# Patient Record
Sex: MALE | Race: WHITE | NOT HISPANIC OR LATINO
[De-identification: names, ages, dates, MRNs, and addresses within clinical notes are randomized per-mention and may not be internally consistent; named-entity substitution may affect disease eponyms.]

---

## 2017-01-04 ENCOUNTER — MEDICATION RENEWAL (OUTPATIENT)
Age: 57
End: 2017-01-04

## 2017-01-23 ENCOUNTER — RX RENEWAL (OUTPATIENT)
Age: 57
End: 2017-01-23

## 2017-02-27 ENCOUNTER — APPOINTMENT (OUTPATIENT)
Dept: INTERNAL MEDICINE | Facility: CLINIC | Age: 57
End: 2017-02-27

## 2017-02-27 VITALS
RESPIRATION RATE: 12 BRPM | HEIGHT: 72 IN | BODY MASS INDEX: 25.06 KG/M2 | WEIGHT: 185 LBS | HEART RATE: 68 BPM | DIASTOLIC BLOOD PRESSURE: 79 MMHG | SYSTOLIC BLOOD PRESSURE: 113 MMHG

## 2017-02-27 LAB
GLUCOSE BLDC GLUCOMTR-MCNC: NORMAL
GLUCOSE SERPL-MCNC: NORMAL

## 2017-03-06 ENCOUNTER — MOBILE ON CALL (OUTPATIENT)
Age: 57
End: 2017-03-06

## 2017-06-12 ENCOUNTER — APPOINTMENT (OUTPATIENT)
Dept: INTERNAL MEDICINE | Facility: CLINIC | Age: 57
End: 2017-06-12

## 2017-07-21 LAB — HBA1C MFR BLD HPLC: 5.8 %

## 2017-07-24 ENCOUNTER — APPOINTMENT (OUTPATIENT)
Dept: INTERNAL MEDICINE | Facility: CLINIC | Age: 57
End: 2017-07-24

## 2017-07-24 VITALS
WEIGHT: 187 LBS | HEIGHT: 72 IN | HEART RATE: 65 BPM | DIASTOLIC BLOOD PRESSURE: 70 MMHG | SYSTOLIC BLOOD PRESSURE: 111 MMHG | RESPIRATION RATE: 12 BRPM | BODY MASS INDEX: 25.33 KG/M2 | OXYGEN SATURATION: 98 %

## 2017-07-24 LAB — GLUCOSE BLDC GLUCOMTR-MCNC: 124

## 2017-12-18 ENCOUNTER — TRANSCRIPTION ENCOUNTER (OUTPATIENT)
Age: 57
End: 2017-12-18

## 2018-01-07 ENCOUNTER — FORM ENCOUNTER (OUTPATIENT)
Age: 58
End: 2018-01-07

## 2018-01-08 ENCOUNTER — APPOINTMENT (OUTPATIENT)
Dept: OTOLARYNGOLOGY | Facility: CLINIC | Age: 58
End: 2018-01-08
Payer: COMMERCIAL

## 2018-01-08 ENCOUNTER — OUTPATIENT (OUTPATIENT)
Dept: OUTPATIENT SERVICES | Facility: HOSPITAL | Age: 58
LOS: 1 days | End: 2018-01-08
Payer: COMMERCIAL

## 2018-01-08 ENCOUNTER — APPOINTMENT (OUTPATIENT)
Dept: RADIOLOGY | Facility: HOSPITAL | Age: 58
End: 2018-01-08

## 2018-01-08 VITALS
HEIGHT: 72 IN | TEMPERATURE: 98.7 F | WEIGHT: 187 LBS | BODY MASS INDEX: 25.33 KG/M2 | HEART RATE: 90 BPM | OXYGEN SATURATION: 98 % | SYSTOLIC BLOOD PRESSURE: 132 MMHG | DIASTOLIC BLOOD PRESSURE: 72 MMHG

## 2018-01-08 DIAGNOSIS — S09.92XA UNSPECIFIED INJURY OF NOSE, INITIAL ENCOUNTER: ICD-10-CM

## 2018-01-08 PROCEDURE — 99203 OFFICE O/P NEW LOW 30 MIN: CPT

## 2018-01-08 PROCEDURE — 70160 X-RAY EXAM OF NASAL BONES: CPT | Mod: 26

## 2018-01-08 PROCEDURE — 70160 X-RAY EXAM OF NASAL BONES: CPT

## 2018-01-16 ENCOUNTER — RESULT REVIEW (OUTPATIENT)
Age: 58
End: 2018-01-16

## 2018-01-19 ENCOUNTER — APPOINTMENT (OUTPATIENT)
Dept: OTOLARYNGOLOGY | Facility: CLINIC | Age: 58
End: 2018-01-19

## 2018-01-29 ENCOUNTER — RX RENEWAL (OUTPATIENT)
Age: 58
End: 2018-01-29

## 2018-03-23 ENCOUNTER — OTHER (OUTPATIENT)
Age: 58
End: 2018-03-23

## 2018-03-26 ENCOUNTER — APPOINTMENT (OUTPATIENT)
Dept: INTERNAL MEDICINE | Facility: CLINIC | Age: 58
End: 2018-03-26
Payer: COMMERCIAL

## 2018-03-26 ENCOUNTER — NON-APPOINTMENT (OUTPATIENT)
Age: 58
End: 2018-03-26

## 2018-03-26 VITALS
BODY MASS INDEX: 26.55 KG/M2 | RESPIRATION RATE: 12 BRPM | DIASTOLIC BLOOD PRESSURE: 80 MMHG | SYSTOLIC BLOOD PRESSURE: 130 MMHG | HEIGHT: 72 IN | HEART RATE: 79 BPM | WEIGHT: 196 LBS | OXYGEN SATURATION: 98 %

## 2018-03-26 DIAGNOSIS — M51.37 OTHER INTERVERTEBRAL DISC DEGENERATION, LUMBOSACRAL REGION: ICD-10-CM

## 2018-03-26 LAB
25(OH)D3 SERPL-MCNC: 28.3 NG/ML
ALBUMIN SERPL ELPH-MCNC: 4.5 G/DL
ALP BLD-CCNC: 44 U/L
ALT SERPL-CCNC: 23 U/L
ANION GAP SERPL CALC-SCNC: 14 MMOL/L
AST SERPL-CCNC: 21 U/L
BILIRUB SERPL-MCNC: 1 MG/DL
BUN SERPL-MCNC: 18 MG/DL
CALCIUM SERPL-MCNC: 9.7 MG/DL
CHLORIDE SERPL-SCNC: 99 MMOL/L
CHOLEST SERPL-MCNC: 187 MG/DL
CHOLEST/HDLC SERPL: 2.9 RATIO
CO2 SERPL-SCNC: 26 MMOL/L
CREAT SERPL-MCNC: 0.94 MG/DL
GLUCOSE BLDC GLUCOMTR-MCNC: NORMAL
GLUCOSE SERPL-MCNC: 120 MG/DL
HBA1C MFR BLD HPLC: 6.2 %
HDLC SERPL-MCNC: 64 MG/DL
LDLC SERPL CALC-MCNC: 104 MG/DL
POTASSIUM SERPL-SCNC: 4.6 MMOL/L
PROT SERPL-MCNC: 7.2 G/DL
PSA FREE FLD-MCNC: 39.4
PSA FREE SERPL-MCNC: 0.43 NG/ML
PSA SERPL-MCNC: 1.09 NG/ML
SODIUM SERPL-SCNC: 139 MMOL/L
TRIGL SERPL-MCNC: 93 MG/DL

## 2018-03-26 PROCEDURE — 99396 PREV VISIT EST AGE 40-64: CPT | Mod: 25

## 2018-03-26 PROCEDURE — 93000 ELECTROCARDIOGRAM COMPLETE: CPT

## 2018-03-26 PROCEDURE — 82270 OCCULT BLOOD FECES: CPT

## 2018-03-26 PROCEDURE — 82962 GLUCOSE BLOOD TEST: CPT

## 2018-07-13 LAB — HBA1C MFR BLD HPLC: 6.3 %

## 2018-07-16 ENCOUNTER — APPOINTMENT (OUTPATIENT)
Dept: INTERNAL MEDICINE | Facility: CLINIC | Age: 58
End: 2018-07-16
Payer: COMMERCIAL

## 2018-07-16 VITALS
WEIGHT: 199.3 LBS | SYSTOLIC BLOOD PRESSURE: 153 MMHG | BODY MASS INDEX: 26.99 KG/M2 | HEART RATE: 71 BPM | DIASTOLIC BLOOD PRESSURE: 83 MMHG | RESPIRATION RATE: 12 BRPM | HEIGHT: 72 IN

## 2018-07-16 DIAGNOSIS — S09.92XA UNSPECIFIED INJURY OF NOSE, INITIAL ENCOUNTER: ICD-10-CM

## 2018-07-16 LAB — GLUCOSE BLDC GLUCOMTR-MCNC: ABNORMAL

## 2018-07-16 PROCEDURE — 99213 OFFICE O/P EST LOW 20 MIN: CPT | Mod: 25

## 2018-07-16 PROCEDURE — 82962 GLUCOSE BLOOD TEST: CPT

## 2018-07-16 NOTE — HISTORY OF PRESENT ILLNESS
[de-identified] : This visit is to monitor the status of this patient's diabetes. The last visit was       months ago. Compliance is good with medications, fair with diet and poor with exercise. There are no reported symptoms of high or low glucose. There is no reported change in vision. There is no reported unexplained weight gain or loss. There are no reported problems with the feet.  The last glycohemoglobin was     6.3  on   7-18       . Lipids have been OK.

## 2018-10-19 LAB — HBA1C MFR BLD HPLC: 6.4 %

## 2018-10-29 ENCOUNTER — APPOINTMENT (OUTPATIENT)
Dept: INTERNAL MEDICINE | Facility: CLINIC | Age: 58
End: 2018-10-29
Payer: COMMERCIAL

## 2018-10-29 VITALS
HEART RATE: 80 BPM | SYSTOLIC BLOOD PRESSURE: 121 MMHG | DIASTOLIC BLOOD PRESSURE: 73 MMHG | OXYGEN SATURATION: 99 % | BODY MASS INDEX: 26.14 KG/M2 | HEIGHT: 72 IN | RESPIRATION RATE: 12 BRPM | WEIGHT: 193 LBS

## 2018-10-29 DIAGNOSIS — M54.30 SCIATICA, UNSPECIFIED SIDE: ICD-10-CM

## 2018-10-29 LAB — GLUCOSE BLDC GLUCOMTR-MCNC: ABNORMAL

## 2018-10-29 PROCEDURE — 90686 IIV4 VACC NO PRSV 0.5 ML IM: CPT

## 2018-10-29 PROCEDURE — 99214 OFFICE O/P EST MOD 30 MIN: CPT | Mod: 25

## 2018-10-29 PROCEDURE — G0008: CPT

## 2018-10-29 PROCEDURE — 82962 GLUCOSE BLOOD TEST: CPT

## 2018-10-29 NOTE — HISTORY OF PRESENT ILLNESS
[Diabetes Mellitus] : Diabetes Mellitus [FreeTextEntry6] : Pt. having intermittent leg pain on his left side, hx. of sciatica on his right.\par Nasal stuffiness in the AM. [No episodes] : No hypoglycemic episodes since the last visit. [Does not check] : Patient does not check blood glucose regularly [Understanding of foot care] : Patient expressed understanding of foot care [Retinopathy] : No retinopathy [Most Recent A1C: ___] : Most recent A1C was [unfilled] [EyeExamDate] : 10/18

## 2019-01-06 ENCOUNTER — FORM ENCOUNTER (OUTPATIENT)
Age: 59
End: 2019-01-06

## 2019-01-07 ENCOUNTER — OUTPATIENT (OUTPATIENT)
Dept: OUTPATIENT SERVICES | Facility: HOSPITAL | Age: 59
LOS: 1 days | End: 2019-01-07
Payer: COMMERCIAL

## 2019-01-07 ENCOUNTER — APPOINTMENT (OUTPATIENT)
Dept: ULTRASOUND IMAGING | Facility: HOSPITAL | Age: 59
End: 2019-01-07
Payer: COMMERCIAL

## 2019-01-07 DIAGNOSIS — Z00.8 ENCOUNTER FOR OTHER GENERAL EXAMINATION: ICD-10-CM

## 2019-01-07 PROCEDURE — 76775 US EXAM ABDO BACK WALL LIM: CPT

## 2019-01-07 PROCEDURE — 76775 US EXAM ABDO BACK WALL LIM: CPT | Mod: 26

## 2019-04-30 ENCOUNTER — TRANSCRIPTION ENCOUNTER (OUTPATIENT)
Age: 59
End: 2019-04-30

## 2019-05-03 LAB — HBA1C MFR BLD HPLC: 6.7 %

## 2019-09-24 ENCOUNTER — TRANSCRIPTION ENCOUNTER (OUTPATIENT)
Age: 59
End: 2019-09-24

## 2019-10-04 LAB
CHOLEST SERPL-MCNC: 190 MG/DL
CHOLEST/HDLC SERPL: 3.1 RATIO
CREAT SPEC-SCNC: 260 MG/DL
ESTIMATED AVERAGE GLUCOSE: 148 MG/DL
HBA1C MFR BLD HPLC: 6.8 %
HDLC SERPL-MCNC: 61 MG/DL
LDLC SERPL CALC-MCNC: 107 MG/DL
MICROALBUMIN 24H UR DL<=1MG/L-MCNC: <1.2 MG/DL
MICROALBUMIN/CREAT 24H UR-RTO: NORMAL MG/G
TRIGL SERPL-MCNC: 109 MG/DL

## 2019-10-07 ENCOUNTER — OTHER (OUTPATIENT)
Age: 59
End: 2019-10-07

## 2019-10-07 ENCOUNTER — APPOINTMENT (OUTPATIENT)
Dept: INTERNAL MEDICINE | Facility: CLINIC | Age: 59
End: 2019-10-07
Payer: COMMERCIAL

## 2019-10-07 ENCOUNTER — NON-APPOINTMENT (OUTPATIENT)
Age: 59
End: 2019-10-07

## 2019-10-07 VITALS
DIASTOLIC BLOOD PRESSURE: 84 MMHG | OXYGEN SATURATION: 99 % | SYSTOLIC BLOOD PRESSURE: 140 MMHG | BODY MASS INDEX: 26.01 KG/M2 | WEIGHT: 192 LBS | HEIGHT: 72 IN | RESPIRATION RATE: 12 BRPM | HEART RATE: 89 BPM

## 2019-10-07 DIAGNOSIS — M25.561 PAIN IN RIGHT KNEE: ICD-10-CM

## 2019-10-07 DIAGNOSIS — M48.00 SPINAL STENOSIS, SITE UNSPECIFIED: ICD-10-CM

## 2019-10-07 PROCEDURE — 99396 PREV VISIT EST AGE 40-64: CPT | Mod: 25

## 2019-10-07 PROCEDURE — 90471 IMMUNIZATION ADMIN: CPT

## 2019-10-07 PROCEDURE — 90714 TD VACC NO PRESV 7 YRS+ IM: CPT

## 2019-10-07 PROCEDURE — 93000 ELECTROCARDIOGRAM COMPLETE: CPT

## 2019-10-07 NOTE — PHYSICAL EXAM
[No Acute Distress] : no acute distress [Well Nourished] : well nourished [Well Developed] : well developed [Well-Appearing] : well-appearing [PERRL] : pupils equal round and reactive to light [Normal Sclera/Conjunctiva] : normal sclera/conjunctiva [EOMI] : extraocular movements intact [Normal Outer Ear/Nose] : the outer ears and nose were normal in appearance [Normal Oropharynx] : the oropharynx was normal [No JVD] : no jugular venous distention [No Lymphadenopathy] : no lymphadenopathy [Supple] : supple [Thyroid Normal, No Nodules] : the thyroid was normal and there were no nodules present [No Respiratory Distress] : no respiratory distress  [No Accessory Muscle Use] : no accessory muscle use [Clear to Auscultation] : lungs were clear to auscultation bilaterally [Normal Rate] : normal rate  [Regular Rhythm] : with a regular rhythm [Normal S1, S2] : normal S1 and S2 [No Murmur] : no murmur heard [No Carotid Bruits] : no carotid bruits [No Abdominal Bruit] : a ~M bruit was not heard ~T in the abdomen [No Varicosities] : no varicosities [Pedal Pulses Present] : the pedal pulses are present [No Edema] : there was no peripheral edema [No Palpable Aorta] : no palpable aorta [No Extremity Clubbing/Cyanosis] : no extremity clubbing/cyanosis [Non Tender] : non-tender [Soft] : abdomen soft [No Masses] : no abdominal mass palpated [Non-distended] : non-distended [No HSM] : no HSM [Normal Bowel Sounds] : normal bowel sounds [Normal Anterior Cervical Nodes] : no anterior cervical lymphadenopathy [Normal Posterior Cervical Nodes] : no posterior cervical lymphadenopathy [No CVA Tenderness] : no CVA  tenderness [No Spinal Tenderness] : no spinal tenderness [No Joint Swelling] : no joint swelling [Grossly Normal Strength/Tone] : grossly normal strength/tone [No Rash] : no rash [Coordination Grossly Intact] : coordination grossly intact [No Focal Deficits] : no focal deficits [Normal Gait] : normal gait [Deep Tendon Reflexes (DTR)] : deep tendon reflexes were 2+ and symmetric [Normal Insight/Judgement] : insight and judgment were intact [Normal Affect] : the affect was normal

## 2019-10-07 NOTE — HEALTH RISK ASSESSMENT
[Excellent] : ~his/her~  mood as  excellent [Intercurrent Urgi Care visits] : went to urgent care [Never (0 pts)] : Never (0 points) [No] : In the past 12 months have you used drugs other than those required for medical reasons? No [No falls in past year] : Patient reported no falls in the past year [0] : 1) Little interest or pleasure doing things: Not at all (0) [Fully functional (using the telephone, shopping, preparing meals, housekeeping, doing laundry, using] : Fully functional and needs no help or supervision to perform IADLs (using the telephone, shopping, preparing meals, housekeeping, doing laundry, using transportation, managing medications and managing finances) [Fully functional (bathing, dressing, toileting, transferring, walking, feeding)] : Fully functional (bathing, dressing, toileting, transferring, walking, feeding) [Designated Healthcare Proxy] : Designated healthcare proxy [Name: ___] : Health Care Proxy's Name: [unfilled]  [Relationship: ___] : Relationship: [unfilled] [I will adhere to the patient's wishes as expressed in the advance directive except as noted below.] : I will adhere to the patient's wishes as expressed in the advance directive except as noted below [FreeTextEntry1] : right knee pain,  [] : No [de-identified] : fluid in ear. [de-identified] : physically active [de-identified] : regular, carbs in diet. [[id number]] : 0 [Reports changes in hearing] : Reports no changes in hearing [Reports changes in vision] : Reports no changes in vision [ColonoscopyComments] : Due for colonoscopy [Reports normal functional visual acuity (ie: able to read med bottle)] : Reports poor functional visual acuity.  [Reports changes in dental health] : Reports no changes in dental health [de-identified] : reading glasses.

## 2019-10-07 NOTE — PLAN
[FreeTextEntry1] : Td vaccine given.\par Continue medication as prescribed, escribed meds.\par check hgba1c in 3 months.\par check psa at that time.

## 2019-11-05 ENCOUNTER — TRANSCRIPTION ENCOUNTER (OUTPATIENT)
Age: 59
End: 2019-11-05

## 2019-12-12 ENCOUNTER — APPOINTMENT (OUTPATIENT)
Dept: OTOLARYNGOLOGY | Facility: CLINIC | Age: 59
End: 2019-12-12
Payer: COMMERCIAL

## 2019-12-12 VITALS
WEIGHT: 192 LBS | OXYGEN SATURATION: 98 % | TEMPERATURE: 98.5 F | DIASTOLIC BLOOD PRESSURE: 72 MMHG | HEIGHT: 72 IN | SYSTOLIC BLOOD PRESSURE: 150 MMHG | BODY MASS INDEX: 26.01 KG/M2 | HEART RATE: 109 BPM

## 2019-12-12 DIAGNOSIS — H69.83 OTHER SPECIFIED DISORDERS OF EUSTACHIAN TUBE, BILATERAL: ICD-10-CM

## 2019-12-12 DIAGNOSIS — J34.2 DEVIATED NASAL SEPTUM: ICD-10-CM

## 2019-12-12 DIAGNOSIS — R09.89 OTHER SPECIFIED SYMPTOMS AND SIGNS INVOLVING THE CIRCULATORY AND RESPIRATORY SYSTEMS: ICD-10-CM

## 2019-12-12 PROCEDURE — 31575 DIAGNOSTIC LARYNGOSCOPY: CPT

## 2019-12-12 PROCEDURE — 99214 OFFICE O/P EST MOD 30 MIN: CPT | Mod: 25

## 2019-12-12 NOTE — PROCEDURE
[Unable to Cooperate with Mirror] : patient unable to cooperate with mirror [Topical Lidocaine] : topical lidocaine [Globus] : globus [Serial Number: ___] : Serial Number: [unfilled] [Flexible Endoscope] : examined with the flexible endoscope [Oxymetazoline HCl] : oxymetazoline HCl [Normal] : the epiglottis was regular without inflammation, lesions or masses, with regular aryepiglottic folds, and a smooth petiolus [Present] : absent [Lesion(s)] : lesion(s) [True Vocal Cords Paralysis] : no true vocal cord paralysis [___] : [unfilled]Ucm polyp/cyst on the right [True Vocal Cords Erythematous] : no true vocal cord edema [True Vocal Cords Hirsch's Nodules] : no true vocal cord nodules [Glottis Arytenoid Cartilages Erythema] : no arytenoid erythema [Glottis Arytenoid Cartilages] : no arytenoid granulomas [de-identified] : R arytenoid cyst.

## 2019-12-12 NOTE — HISTORY OF PRESENT ILLNESS
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  In September pt was seen in Urgent Care and was diagnosed with ETD.  In October pt was exposed to RAID bug spray.  He developed an inflamed uvula.  He was seen in Urgent Care again and was given an Rx for steroid spray.  He is continuing to have the sensation of right sided throat discomfort abd a globus sensation.

## 2019-12-12 NOTE — CONSULT LETTER
[Dear  ___] : Dear  [unfilled], [Courtesy Letter:] : I had the pleasure of seeing your patient, [unfilled], in my office today. [Please see my note below.] : Please see my note below. [Consult Closing:] : Thank you very much for allowing me to participate in the care of this patient.  If you have any questions, please do not hesitate to contact me. [Sincerely,] : Sincerely, [FreeTextEntry2] : Maulik Ford MD [FreeTextEntry3] : Lebron Telles MD, FACS\par Clinical \par Dept. of Otolaryngology and Head & Neck Surgery\par [facility]\par

## 2019-12-18 ENCOUNTER — TRANSCRIPTION ENCOUNTER (OUTPATIENT)
Age: 59
End: 2019-12-18

## 2020-05-07 ENCOUNTER — TRANSCRIPTION ENCOUNTER (OUTPATIENT)
Age: 60
End: 2020-05-07

## 2020-08-21 ENCOUNTER — RX RENEWAL (OUTPATIENT)
Age: 60
End: 2020-08-21

## 2020-11-13 ENCOUNTER — TRANSCRIPTION ENCOUNTER (OUTPATIENT)
Age: 60
End: 2020-11-13

## 2021-04-15 ENCOUNTER — APPOINTMENT (OUTPATIENT)
Dept: DISASTER EMERGENCY | Facility: OTHER | Age: 61
End: 2021-04-15
Payer: COMMERCIAL

## 2021-04-15 PROCEDURE — 0002A: CPT

## 2021-06-25 ENCOUNTER — RX RENEWAL (OUTPATIENT)
Age: 61
End: 2021-06-25

## 2021-07-30 ENCOUNTER — RX RENEWAL (OUTPATIENT)
Age: 61
End: 2021-07-30

## 2021-09-13 ENCOUNTER — APPOINTMENT (OUTPATIENT)
Dept: OTOLARYNGOLOGY | Facility: CLINIC | Age: 61
End: 2021-09-13
Payer: COMMERCIAL

## 2021-09-13 VITALS
SYSTOLIC BLOOD PRESSURE: 145 MMHG | TEMPERATURE: 97.7 F | DIASTOLIC BLOOD PRESSURE: 88 MMHG | HEIGHT: 72 IN | BODY MASS INDEX: 28.33 KG/M2 | WEIGHT: 209.13 LBS | HEART RATE: 109 BPM | OXYGEN SATURATION: 97 %

## 2021-09-13 DIAGNOSIS — J38.7 OTHER DISEASES OF LARYNX: ICD-10-CM

## 2021-09-13 PROCEDURE — 31575 DIAGNOSTIC LARYNGOSCOPY: CPT

## 2021-09-13 PROCEDURE — 99214 OFFICE O/P EST MOD 30 MIN: CPT | Mod: 25

## 2021-09-13 NOTE — CONSULT LETTER
[Dear  ___] : Dear  [unfilled], [Courtesy Letter:] : I had the pleasure of seeing your patient, [unfilled], in my office today. [Please see my note below.] : Please see my note below. [Consult Closing:] : Thank you very much for allowing me to participate in the care of this patient.  If you have any questions, please do not hesitate to contact me. [Sincerely,] : Sincerely, [FreeTextEntry2] : Maulik Ford MD [FreeTextEntry3] : Lerbon Telles MD, FACS\par Chief of Otolaryngology [facility]\par  - Dept. of Otolaryngology\par [facility] [employer]\par \par

## 2021-09-13 NOTE — PHYSICAL EXAM
[] : septum deviated to the left [Midline] : trachea located in midline position [Normal] : no rashes [de-identified] : Edematous

## 2021-09-13 NOTE — PROCEDURE
[Unable to Cooperate with Mirror] : patient unable to cooperate with mirror [Lesion] : lesion identified by mirror examination needing further evaluation [Topical Lidocaine] : topical lidocaine [Oxymetazoline HCl] : oxymetazoline HCl [Flexible Endoscope] : examined with the flexible endoscope [Serial Number: ___] : Serial Number: [unfilled] [Present] : absent [Lesion(s)] : lesion(s) [True Vocal Cords Paralysis] : no true vocal cord paralysis [True Vocal Cords Erythematous] : no true vocal cord edema [True Vocal Cords Hirsch's Nodules] : no true vocal cord nodules [Glottis Arytenoid Cartilages] : no arytenoid granulomas [Glottis Arytenoid Cartilages Erythema] : no arytenoid erythema [Normal] : posterior cricoid area had healthy pink mucosa in the interarytenoid area and the esophageal inlet

## 2021-09-13 NOTE — HISTORY OF PRESENT ILLNESS
[Clear Rhinorrhea] : clear rhinorrhea [Facial Pressure] : facial pressure [Nasal Congestion] : nasal congestion [Postnasal Drainage] : postnasal drainage [de-identified] : Mr. 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  He was supposed to follow up in 3 months then but due to Covid, was unable to do so.  Though he still reports having a globus sensation when he swallows, it is no long as pronounced as before\par \par He also reports that he seems to have increased reaction to allergies with increased sinus discharge with congestion, especially in the winter time.  He was using Terrence Synephrine but stopped.   [Difficulty Swallowing] : no difficulty swallowing [Painful Swallowing] : no painful swallowing

## 2021-10-27 ENCOUNTER — LABORATORY RESULT (OUTPATIENT)
Age: 61
End: 2021-10-27

## 2021-10-27 ENCOUNTER — APPOINTMENT (OUTPATIENT)
Dept: PEDIATRIC ALLERGY IMMUNOLOGY | Facility: CLINIC | Age: 61
End: 2021-10-27
Payer: COMMERCIAL

## 2021-10-27 VITALS
OXYGEN SATURATION: 98 % | WEIGHT: 209 LBS | BODY MASS INDEX: 28.31 KG/M2 | HEIGHT: 72 IN | SYSTOLIC BLOOD PRESSURE: 135 MMHG | TEMPERATURE: 97.6 F | DIASTOLIC BLOOD PRESSURE: 74 MMHG | HEART RATE: 91 BPM

## 2021-10-27 DIAGNOSIS — J34.89 OTHER SPECIFIED DISORDERS OF NOSE AND NASAL SINUSES: ICD-10-CM

## 2021-10-27 DIAGNOSIS — R09.82 POSTNASAL DRIP: ICD-10-CM

## 2021-10-27 DIAGNOSIS — J31.0 CHRONIC RHINITIS: ICD-10-CM

## 2021-10-27 PROCEDURE — 36415 COLL VENOUS BLD VENIPUNCTURE: CPT

## 2021-10-27 PROCEDURE — 99203 OFFICE O/P NEW LOW 30 MIN: CPT | Mod: 25

## 2021-10-30 PROBLEM — J34.89 SINUS PRESSURE: Status: ACTIVE | Noted: 2021-09-13

## 2021-10-30 PROBLEM — R09.82 POSTNASAL DRIP: Status: ACTIVE | Noted: 2021-10-30

## 2021-10-30 RX ORDER — FLUTICASONE PROPIONATE 50 UG/1
50 SPRAY, METERED NASAL DAILY
Qty: 1 | Refills: 11 | Status: COMPLETED | COMMUNITY
Start: 2019-12-12 | End: 2021-10-30

## 2021-10-30 NOTE — SOCIAL HISTORY
[House] : [unfilled] lives in a house  [Radiator/Baseboard] : heating provided by radiator(s)/baseboard(s) [Window Units] : air conditioning provided by window units [Feather Pillows] : has feather pillows [Living Area] : in living area [Dog] : dog [Cockroaches] : Patient states that there are no cockroaches in the home [Feather Comforter] : does not have a feather comforter [Smokers in Household] : there are no smokers in the home [de-identified] : Occasional mice in the house.

## 2021-10-30 NOTE — REVIEW OF SYSTEMS
[Rhinorrhea] : rhinorrhea [Nasal Congestion] : nasal congestion [Post Nasal Drip] : post nasal drip [Nl] : Genitourinary

## 2021-10-30 NOTE — CONSULT LETTER
[Dear  ___] : Dear  [unfilled], [Consult Letter:] : I had the pleasure of evaluating your patient, [unfilled]. [Please see my note below.] : Please see my note below. [Consult Closing:] : Thank you very much for allowing me to participate in the care of this patient.  If you have any questions, please do not hesitate to contact me. [Sincerely,] : Sincerely, [DrBianca  ___] : Dr. SÁNCHEZ [FreeTextEntry3] : Anyi Waldrop MD\par Attending, Division of Allergy and Immunology\par [facility]

## 2021-10-30 NOTE — REASON FOR VISIT
[Initial Consultation] : an initial consultation for [Congestion] : congestion [Runny Nose] : runny nose

## 2021-10-30 NOTE — HISTORY OF PRESENT ILLNESS
[Asthma] : asthma [Eczematous rashes] : eczematous rashes [Food Allergies] : food allergies [de-identified] : 61-year-old male with diabetes mellitus presenting for evaluation of sinus pressure, postnasal drip, nasal congestion. Worse in the winter. Mouth breather, especially at night. Takes Zyrtec before bedtime with improvement. Was using daily NeoSynephrine, discontinued weeks ago. Dust and mold presence at home. No ocular symptoms. \par \par

## 2021-11-01 LAB
A ALTERNATA IGE QN: <0.1 KUA/L
A NIGER IGE QN: <0.1 KUA/L
AMER BEECH IGE QN: 0
BOXELDER IGE QN: <0.1 KUA/L
C HERBARUM IGE QN: <0.1 KUA/L
C LUNATA IGE QN: <0.1 KUA/L
CALIF WALNUT IGE QN: <0.1 KUA/L
CAT DANDER IGE QN: <0.1 KUA/L
CMN PIGWEED IGE QN: <0.1 KUA/L
COCKLEBUR IGE QN: <0.1 KUA/L
COCKSFOOT IGE QN: <0.1 KUA/L
COMMON RAGWEED IGE QN: <0.1 KUA/L
D FARINAE IGE QN: <0.1 KUA/L
D PTERONYSS IGE QN: <0.1 KUA/L
DEPRECATED A ALTERNATA IGE RAST QL: 0
DEPRECATED A NIGER IGE RAST QL: 0
DEPRECATED A PULLULANS IGE RAST QL: 0
DEPRECATED AMER BEECH IGE RAST QL: <0.1 KUA/L
DEPRECATED BOXELDER IGE RAST QL: 0
DEPRECATED C HERBARUM IGE RAST QL: 0
DEPRECATED C LUNATA IGE RAST QL: 0
DEPRECATED CAT DANDER IGE RAST QL: 0
DEPRECATED COCKLEBUR IGE RAST QL: 0
DEPRECATED COCKSFOOT IGE RAST QL: 0
DEPRECATED COMMON PIGWEED IGE RAST QL: 0
DEPRECATED COMMON RAGWEED IGE RAST QL: 0
DEPRECATED D FARINAE IGE RAST QL: 0
DEPRECATED D PTERONYSS IGE RAST QL: 0
DEPRECATED DOG DANDER IGE RAST QL: 0
DEPRECATED ENGL PLANTAIN IGE RAST QL: 0
DEPRECATED F MONILIFORME IGE RAST QL: 0
DEPRECATED GIANT RAGWEED IGE RAST QL: 0
DEPRECATED GOOSE FEATHER IGE RAST QL: 0
DEPRECATED GOOSEFOOT IGE RAST QL: 0
DEPRECATED HORSE DANDER IGE RAST QL: 0
DEPRECATED JOHNSON GRASS IGE RAST QL: 0
DEPRECATED KENT BLUE GRASS IGE RAST QL: 0
DEPRECATED LONDON PLANE IGE RAST QL: 0
DEPRECATED M RACEMOSUS IGE RAST QL: 0
DEPRECATED MUGWORT IGE RAST QL: 0
DEPRECATED P NOTATUM IGE RAST QL: 0
DEPRECATED R NIGRICANS IGE RAST QL: 0
DEPRECATED RABBIT MEAT IGE RAST QL: 0
DEPRECATED RED CEDAR IGE RAST QL: 0
DEPRECATED RED TOP GRASS IGE RAST QL: 0
DEPRECATED ROACH IGE RAST QL: 0
DEPRECATED SILVER BIRCH IGE RAST QL: 0
DEPRECATED TIMOTHY IGE RAST QL: 0
DEPRECATED WHITE ASH IGE RAST QL: 0
DEPRECATED WHITE HICKORY IGE RAST QL: 0
DEPRECATED WHITE OAK IGE RAST QL: 0
DOG DANDER IGE QN: <0.1 KUA/L
ENGL PLANTAIN IGE QN: <0.1 KUA/L
F MONILIFORME IGE QN: <0.1 KUA/L
GIANT RAGWEED IGE QN: <0.1 KUA/L
GOOSE FEATHER IGE QN: <0.1 KUA/L
GOOSEFOOT IGE QN: <0.1 KUA/L
GRAY ALDER (T2) CLASS: 0
GRAY ALDER (T2) CONC: <0.1 KUA/L
HAMSTER EPITHELIUM (E84) CLASS: 0
HAMSTER EPITHELIUM (E84) CONC: <0.1 KUA/L
HORSE DANDER IGE QN: <0.1 KUA/L
JOHNSON GRASS IGE QN: <0.1 KUA/L
KENT BLUE GRASS IGE QN: <0.1 KUA/L
LONDON PLANE IGE QN: <0.1 KUA/L
M RACEMOSUS IGE QN: <0.1 KUA/L
MOLD (AUREOBASIDIUM M12) CONC: <0.1 KUA/L
MOUSE EPITHELIUM (E71) CLASS: 0
MOUSE EPITHELIUM (E71) CONC: <0.1 KUA/L
MUGWORT IGE QN: <0.1 KUA/L
MULBERRY (T70) CLASS: 0
MULBERRY (T70) CONC: <0.1 KUA/L
P NOTATUM IGE QN: <0.1 KUA/L
R NIGRICANS IGE QN: <0.1 KUA/L
RABBIT MEAT IGE QN: <0.1 KUA/L
RED CEDAR IGE QN: <0.1 KUA/L
RED TOP GRASS IGE QN: <0.1 KUA/L
ROACH IGE QN: <0.1 KUA/L
SILVER BIRCH IGE QN: <0.1 KUA/L
TIMOTHY IGE QN: <0.1 KUA/L
TREE ALLERG MIX1 IGE QL: 0
WHITE ASH IGE QN: <0.1 KUA/L
WHITE ELM IGE QN: 0
WHITE ELM IGE QN: <0.1 KUA/L
WHITE HICKORY IGE QN: <0.1 KUA/L
WHITE OAK IGE QN: <0.1 KUA/L

## 2021-11-03 LAB
A FUMIGATUS IGE QN: <0.1 KUA/L
DEPRECATED A FUMIGATUS IGE RAST QL: 0

## 2022-01-10 ENCOUNTER — APPOINTMENT (OUTPATIENT)
Dept: GASTROENTEROLOGY | Facility: CLINIC | Age: 62
End: 2022-01-10
Payer: COMMERCIAL

## 2022-01-10 VITALS
DIASTOLIC BLOOD PRESSURE: 82 MMHG | OXYGEN SATURATION: 98 % | BODY MASS INDEX: 26.82 KG/M2 | SYSTOLIC BLOOD PRESSURE: 156 MMHG | TEMPERATURE: 98 F | WEIGHT: 198 LBS | HEIGHT: 72 IN | HEART RATE: 85 BPM

## 2022-01-10 DIAGNOSIS — Z12.11 ENCOUNTER FOR SCREENING FOR MALIGNANT NEOPLASM OF COLON: ICD-10-CM

## 2022-01-10 PROCEDURE — 99203 OFFICE O/P NEW LOW 30 MIN: CPT

## 2022-01-10 NOTE — ASSESSMENT
[FreeTextEntry1] : 61-year-old man presenting for colon cancer screening evaluation.  I explained to him the risks, alternatives and benefits to a colonoscopy.  Risk including but not limited to bleeding, perforation, infection and adverse medication reaction.  Questions were answered.  He stated understanding.

## 2022-01-10 NOTE — HISTORY OF PRESENT ILLNESS
[FreeTextEntry1] : This is a pleasant 61-year-old man with history of hypertension and hyperlipidemia presenting for colon cancer screening evaluation.  He has never had a lower GI evaluation.  He denies family history of colon cancer.  He reports that his father was found to have colon polyps at a later age but was not the type that required close follow-up colonoscopies.  He denies abdominal pain, nausea or vomiting.  He denies changes in bowel habits.  He denies rectal bleeding or melena.  He denies weight loss or anemia.

## 2022-01-19 ENCOUNTER — RX RENEWAL (OUTPATIENT)
Age: 62
End: 2022-01-19

## 2022-03-18 ENCOUNTER — APPOINTMENT (OUTPATIENT)
Dept: GASTROENTEROLOGY | Facility: AMBULATORY MEDICAL SERVICES | Age: 62
End: 2022-03-18
Payer: COMMERCIAL

## 2022-03-18 PROCEDURE — 45385 COLONOSCOPY W/LESION REMOVAL: CPT | Mod: 33

## 2022-03-25 ENCOUNTER — NON-APPOINTMENT (OUTPATIENT)
Age: 62
End: 2022-03-25

## 2022-04-25 ENCOUNTER — RX RENEWAL (OUTPATIENT)
Age: 62
End: 2022-04-25

## 2022-05-25 ENCOUNTER — RX RENEWAL (OUTPATIENT)
Age: 62
End: 2022-05-25

## 2022-08-01 LAB
25(OH)D3 SERPL-MCNC: 38 NG/ML
ALBUMIN SERPL ELPH-MCNC: 4.5 G/DL
ALP BLD-CCNC: 73 U/L
ALT SERPL-CCNC: 21 U/L
ANION GAP SERPL CALC-SCNC: 12 MMOL/L
AST SERPL-CCNC: 13 U/L
BILIRUB SERPL-MCNC: 0.8 MG/DL
BUN SERPL-MCNC: 16 MG/DL
CALCIUM SERPL-MCNC: 10 MG/DL
CHLORIDE SERPL-SCNC: 94 MMOL/L
CHOLEST SERPL-MCNC: 202 MG/DL
CO2 SERPL-SCNC: 28 MMOL/L
CREAT SERPL-MCNC: 0.85 MG/DL
EGFR: 99 ML/MIN/1.73M2
GLUCOSE SERPL-MCNC: 386 MG/DL
HDLC SERPL-MCNC: 51 MG/DL
LDLC SERPL CALC-MCNC: 117 MG/DL
NONHDLC SERPL-MCNC: 151 MG/DL
POTASSIUM SERPL-SCNC: 4.7 MMOL/L
PROT SERPL-MCNC: 6.7 G/DL
PSA FREE FLD-MCNC: 40 %
PSA FREE SERPL-MCNC: 0.62 NG/ML
PSA SERPL-MCNC: 1.57 NG/ML
SODIUM SERPL-SCNC: 134 MMOL/L
TRIGL SERPL-MCNC: 170 MG/DL
TSH SERPL-ACNC: 1.22 UIU/ML

## 2022-08-03 LAB
BASOPHILS # BLD AUTO: 0.04 K/UL
BASOPHILS NFR BLD AUTO: 0.8 %
EOSINOPHIL # BLD AUTO: 0.07 K/UL
EOSINOPHIL NFR BLD AUTO: 1.3 %
HBA1C MFR BLD HPLC: NORMAL
HCT VFR BLD CALC: 44.7 %
HGB BLD-MCNC: 15.5 G/DL
IMM GRANULOCYTES NFR BLD AUTO: 0.4 %
LYMPHOCYTES # BLD AUTO: 1.29 K/UL
LYMPHOCYTES NFR BLD AUTO: 24.7 %
MAN DIFF?: NORMAL
MCHC RBC-ENTMCNC: 32.7 PG
MCHC RBC-ENTMCNC: 34.7 GM/DL
MCV RBC AUTO: 94.3 FL
MONOCYTES # BLD AUTO: 0.48 K/UL
MONOCYTES NFR BLD AUTO: 9.2 %
NEUTROPHILS # BLD AUTO: 3.33 K/UL
NEUTROPHILS NFR BLD AUTO: 63.6 %
PLATELET # BLD AUTO: 190 K/UL
RBC # BLD: 4.74 M/UL
RBC # FLD: 11.9 %
WBC # FLD AUTO: 5.23 K/UL

## 2022-08-10 ENCOUNTER — RX RENEWAL (OUTPATIENT)
Age: 62
End: 2022-08-10

## 2022-08-19 ENCOUNTER — RX RENEWAL (OUTPATIENT)
Age: 62
End: 2022-08-19

## 2022-08-22 ENCOUNTER — APPOINTMENT (OUTPATIENT)
Dept: INTERNAL MEDICINE | Facility: CLINIC | Age: 62
End: 2022-08-22

## 2022-08-22 VITALS
HEIGHT: 72 IN | SYSTOLIC BLOOD PRESSURE: 128 MMHG | BODY MASS INDEX: 27.09 KG/M2 | TEMPERATURE: 97.8 F | WEIGHT: 200 LBS | DIASTOLIC BLOOD PRESSURE: 80 MMHG | OXYGEN SATURATION: 98 % | HEART RATE: 79 BPM | RESPIRATION RATE: 12 BRPM

## 2022-08-22 DIAGNOSIS — B35.4 TINEA CORPORIS: ICD-10-CM

## 2022-08-22 LAB — HBA1C MFR BLD HPLC: 11.06

## 2022-08-22 PROCEDURE — 99396 PREV VISIT EST AGE 40-64: CPT | Mod: 25

## 2022-08-22 PROCEDURE — 83036 HEMOGLOBIN GLYCOSYLATED A1C: CPT | Mod: QW

## 2022-08-22 RX ORDER — KETOCONAZOLE 20.5 MG/ML
2 SHAMPOO, SUSPENSION TOPICAL
Qty: 1 | Refills: 3 | Status: ACTIVE | COMMUNITY
Start: 2022-08-22 | End: 1900-01-01

## 2022-08-22 RX ORDER — FLUTICASONE PROPIONATE 50 UG/1
50 SPRAY, METERED NASAL DAILY
Qty: 3 | Refills: 1 | Status: DISCONTINUED | COMMUNITY
Start: 2021-10-27 | End: 2022-08-22

## 2022-08-22 RX ORDER — SODIUM SULFATE, POTASSIUM SULFATE, MAGNESIUM SULFATE 17.5; 3.13; 1.6 G/ML; G/ML; G/ML
17.5-3.13-1.6 SOLUTION, CONCENTRATE ORAL
Qty: 1 | Refills: 0 | Status: DISCONTINUED | COMMUNITY
Start: 2022-01-10 | End: 2022-08-22

## 2022-09-16 ENCOUNTER — APPOINTMENT (OUTPATIENT)
Dept: INTERNAL MEDICINE | Facility: CLINIC | Age: 62
End: 2022-09-16

## 2022-12-08 LAB
ESTIMATED AVERAGE GLUCOSE: 263 MG/DL
HBA1C MFR BLD HPLC: 10.8 %

## 2022-12-13 ENCOUNTER — APPOINTMENT (OUTPATIENT)
Dept: ENDOCRINOLOGY | Facility: CLINIC | Age: 62
End: 2022-12-13

## 2022-12-13 ENCOUNTER — NON-APPOINTMENT (OUTPATIENT)
Age: 62
End: 2022-12-13

## 2022-12-13 VITALS
OXYGEN SATURATION: 98 % | HEIGHT: 72 IN | SYSTOLIC BLOOD PRESSURE: 112 MMHG | HEART RATE: 84 BPM | RESPIRATION RATE: 12 BRPM | BODY MASS INDEX: 26.82 KG/M2 | DIASTOLIC BLOOD PRESSURE: 84 MMHG | WEIGHT: 198 LBS | TEMPERATURE: 97.4 F

## 2022-12-13 PROCEDURE — 99204 OFFICE O/P NEW MOD 45 MIN: CPT

## 2022-12-13 NOTE — ASSESSMENT
[FreeTextEntry1] : Target: HbA1c < 7%, BP < 140/90\par \par HbA1c is above goal - patient has diarrhea with metformin 500 mg po bid so I decreased this to 500 mg po daily and I changed it to the ER formulation. I also prescribed Farxiga 10 mg po daily. \par BP is at goal\par \par Patient is on statin and ACEi - no indication for Aspirin.\par \par Last lipid panel - Aug 2022 - Trig 170, \par Last HbA1c - 12/06/2022 - 10.8%\par Last Vitamin B12 - None seen\par Last urine albumin panel - 2019 - Negative\par \par Plan:\par 1. Change metformin to metformin  mg po daily\par 2. Start Farxiga 10 mg po daily\par 3. Fingersticks to be done once daily\par 4. No labs ordered\par 5. Follow up in 4 weeks to review meter.

## 2022-12-13 NOTE — PHYSICAL EXAM
[de-identified] : General: No distress, well nourished\par Eyes: Normal Sclera, EOMI, PERRL\par ENT: Normal appearance of the nose, normal oropharynx, normal dentition\par Neck/Thyroid: No cervical lymphadenopathy, thyroid gland 20 g in size, no thyroid nodules, non-tender\par Respiratory: No use of accessory muscles of respiration, vesicular breath sounds heard bilaterally, no crepitations or ronchi\par Cardiovascular: S1 and S2 heard and normal, no S3 or S4, no murmurs, radial pulse normal bilaterally\par Abdomen: soft, non-tender, no masses, normal bowel sounds\par Musculoskeletal: No swelling or deformities of joints of hands, no pedal edema\par Neurological: Normal range of motion in the hands, Normal brachioradialis reflexes bilaterally\par Psychiatry: Patient converses normally, good judgement and insight\par Skin: No rashes in hands, no nodules palpated in hands

## 2022-12-13 NOTE — HISTORY OF PRESENT ILLNESS
[FreeTextEntry1] : Problems:\par 1. DM type 2\par 2. Hypertension\par 3. Hyperlipidemia\par \par DM type 2\par 1. Diagnosed in 2015\par 2. Meds:\par Metformin 500 mg po bid (has intolerable diarrhea with this so I decreased this to 500 mg po daily and I changed this to the ER formulation on 12/13/2022 - patient says he did not have diarrhea with once daily dosing)\par No frequent UTIs or genital candidiasis.\par 3. Fingersticks done per day - patient does not want to use a CGM, no hypoglycemic unawareness\par 4. Not on Aspirin, on atorvastatin 20 mg po daily, on lisinopril/HCTZ 20/25 mg po daily\par 5. Complications:\par No DM nephropathy (normal creatinine, neg urine microalbumin panel in 2019)\par No DM retinopathy (last eye exam was in October 2022, patient advised on the need for annual DM eye exam)\par No ASCVD\par No foot ulcers/amputation\par 6. Patient never smoked cigarettes

## 2022-12-19 RX ORDER — BLOOD-GLUCOSE METER
W/DEVICE EACH MISCELLANEOUS
Qty: 1 | Refills: 0 | Status: ACTIVE | COMMUNITY
Start: 2022-12-13 | End: 1900-01-01

## 2022-12-29 ENCOUNTER — APPOINTMENT (OUTPATIENT)
Dept: ENDOCRINOLOGY | Facility: CLINIC | Age: 62
End: 2022-12-29
Payer: COMMERCIAL

## 2022-12-29 ENCOUNTER — NON-APPOINTMENT (OUTPATIENT)
Age: 62
End: 2022-12-29

## 2022-12-29 PROCEDURE — G0108 DIAB MANAGE TRN  PER INDIV: CPT

## 2023-01-06 NOTE — HEALTH RISK ASSESSMENT
[Excellent] : ~his/her~  mood as  excellent [Never] : Never [No] : In the past 12 months have you used drugs other than those required for medical reasons? No [No falls in past year] : Patient reported no falls in the past year [0] : 2) Feeling down, depressed, or hopeless: Not at all (0) [HIV test declined] : HIV test declined [Hepatitis C test declined] : Hepatitis C test declined [None] : None [With Family] : lives with family [Employed] : employed [Graduate School] : graduate school [] :  [Sexually Active] : sexually active [Feels Safe at Home] : Feels safe at home [Fully functional (bathing, dressing, toileting, transferring, walking, feeding)] : Fully functional (bathing, dressing, toileting, transferring, walking, feeding) [Fully functional (using the telephone, shopping, preparing meals, housekeeping, doing laundry, using] : Fully functional and needs no help or supervision to perform IADLs (using the telephone, shopping, preparing meals, housekeeping, doing laundry, using transportation, managing medications and managing finances) [Smoke Detector] : smoke detector [Carbon Monoxide Detector] : carbon monoxide detector [Seat Belt] :  uses seat belt [Sunscreen] : uses sunscreen [PHQ-2 Negative - No further assessment needed] : PHQ-2 Negative - No further assessment needed [Patient reported colonoscopy was abnormal] : Patient reported colonoscopy was abnormal [FreeTextEntry1] : arthritic pains ;  [de-identified] : no [Audit-CScore] : 0 [de-identified] : very active  [de-identified] : chicken/ beef /veggies/ salads/ water  [[id number]] : 0 [Change in mental status noted] : No change in mental status noted [Language] : denies difficulty with language [Behavior] : denies difficulty with behavior [Learning/Retaining New Information] : denies difficulty learning/retaining new information [Handling Complex Tasks] : denies difficulty handling complex tasks [Reasoning] : denies difficulty with reasoning [Spatial Ability and Orientation] : denies difficulty with spatial ability and orientation [High Risk Behavior] : no high risk behavior [Reports changes in hearing] : Reports no changes in hearing [Reports changes in vision] : Reports no changes in vision [Reports normal functional visual acuity (ie: able to read med bottle)] : Reports poor functional visual acuity.  [Reports changes in dental health] : Reports no changes in dental health [Guns at Home] : no guns at home [Safety elements used in home] : no safety elements used in home [Travel to Developing Areas] : does not  travel to developing areas [TB Exposure] : is not being exposed to tuberculosis [Caregiver Concerns] : does not have caregiver concerns [ColonoscopyDate] : 03/22 [ColonoscopyComments] : 4 polyps

## 2023-01-06 NOTE — PLAN
[FreeTextEntry1] : Renewed medications. Increase Metformin to 2x/day.\par Strict dietary modification.\par Ketoconazole shampoo prescribed.\par Check POCT: A1c. (11.6)\par Increase aerobic conditioning.\par Continue Flonase.

## 2023-01-10 ENCOUNTER — APPOINTMENT (OUTPATIENT)
Dept: ENDOCRINOLOGY | Facility: CLINIC | Age: 63
End: 2023-01-10
Payer: COMMERCIAL

## 2023-01-10 VITALS
HEIGHT: 72 IN | OXYGEN SATURATION: 98 % | TEMPERATURE: 97.5 F | BODY MASS INDEX: 25.06 KG/M2 | HEART RATE: 86 BPM | SYSTOLIC BLOOD PRESSURE: 118 MMHG | WEIGHT: 185 LBS | DIASTOLIC BLOOD PRESSURE: 76 MMHG

## 2023-01-10 DIAGNOSIS — Z78.9 OTHER SPECIFIED HEALTH STATUS: ICD-10-CM

## 2023-01-10 PROCEDURE — 99214 OFFICE O/P EST MOD 30 MIN: CPT

## 2023-01-10 RX ORDER — ALCOHOL ANTISEPTIC PADS
70 PADS, MEDICATED (EA) TOPICAL
Qty: 300 | Refills: 3 | Status: ACTIVE | COMMUNITY
Start: 2022-12-13 | End: 1900-01-01

## 2023-01-10 RX ORDER — BLOOD SUGAR DIAGNOSTIC
STRIP MISCELLANEOUS 3 TIMES DAILY
Qty: 300 | Refills: 3 | Status: ACTIVE | COMMUNITY
Start: 2022-12-13 | End: 1900-01-01

## 2023-01-10 RX ORDER — LANCETS
EACH MISCELLANEOUS
Qty: 300 | Refills: 3 | Status: ACTIVE | COMMUNITY
Start: 2022-12-13 | End: 1900-01-01

## 2023-01-10 NOTE — HISTORY OF PRESENT ILLNESS
[FreeTextEntry1] : Problems:\par 1. DM type 2\par 2. Hypertension\par 3. Hyperlipidemia\par \par \par DM type 2\par 1. Diagnosed in 2015\par 2. Meds:\par Metformin  mg po daily (has tolerable diarrhea with this - patient had intolerable diarrhea with higher doses)\par Farxiga 10 mg po daily  (No frequent UTIs or genital candidiasis).\par No pancreatitis, no personal or family history of medullary thyroid cancer. \par 3. Fingersticks done 3 times per day -  patient does not want to use a CGM  - 153 to 256 no hypoglycemic unawareness\par 4. Not on Aspirin, on atorvastatin 20 mg po daily, on lisinopril/HCTZ 20/25 mg po daily\par 5. Complications:\par No DM nephropathy (normal creatinine, neg urine microalbumin panel in 2019)\par No DM retinopathy (last eye exam was in October 2022, patient advised on the need for annual DM eye exam)\par No ASCVD\par No foot ulcers/amputation\par 6. Patient never smoked cigarettes

## 2023-01-10 NOTE — PHYSICAL EXAM
[de-identified] : General: No distress, well nourished\par Eyes: Normal Sclera, EOMI, PERRL\par ENT: Normal appearance of the nose, normal oropharynx, normal dentition\par Neck/Thyroid: No cervical lymphadenopathy, thyroid gland 20 g in size, no thyroid nodules, non-tender\par Respiratory: No use of accessory muscles of respiration, vesicular breath sounds heard bilaterally, no crepitations or ronchi\par Cardiovascular: S1 and S2 heard and normal, no S3 or S4, no murmurs, radial pulse normal bilaterally\par Abdomen: soft, non-tender, no masses, normal bowel sounds\par Musculoskeletal: No swelling or deformities of joints of hands, no pedal edema\par Neurological: Normal range of motion in the hands, Normal brachioradialis reflexes bilaterally\par Psychiatry: Patient converses normally, good judgement and insight\par Skin: No rashes in hands, no nodules palpated in hands  [de-identified] : DM foot exam done on 01/10/2023:\par No ulcers seen in feet\par Dorsalis pedis pulses normal bilaterally\par Sensation to 10 g monofilament normal in feet bilaterally

## 2023-01-10 NOTE — ASSESSMENT
[FreeTextEntry1] : Target: HbA1c < 7%, BP < 140/90\par \par HbA1c is above goal - the patient's fingersticks are improving but still elevated so I prescribed Ozempic. \par BP is at goal. \par \par Patient is on statin and ACEi - no indication for Aspirin.\par \par Last lipid panel - Aug 2022 - Trig 170, \par Last HbA1c - 12/06/2022 - 10.8%\par Last Vitamin B12 - None seen\par Last urine albumin panel - 2019 - Negative\par \par \par Plan:\par 1. Start Ozempic 0.25 mg sc once weekly for four weeks and then increase to 0.5 mg sc once weekly\par 2. Continue Farxiga 10 mg po daily\par 3. Fingersticks to be done three times daily\par 4. No labs ordered\par 5. Follow up in 4 weeks to review meter.

## 2023-01-13 ENCOUNTER — APPOINTMENT (OUTPATIENT)
Dept: INTERNAL MEDICINE | Facility: CLINIC | Age: 63
End: 2023-01-13
Payer: COMMERCIAL

## 2023-01-13 VITALS
RESPIRATION RATE: 12 BRPM | WEIGHT: 186 LBS | TEMPERATURE: 97.4 F | OXYGEN SATURATION: 97 % | HEART RATE: 81 BPM | SYSTOLIC BLOOD PRESSURE: 113 MMHG | HEIGHT: 72 IN | BODY MASS INDEX: 25.19 KG/M2 | DIASTOLIC BLOOD PRESSURE: 70 MMHG

## 2023-01-13 PROCEDURE — 99214 OFFICE O/P EST MOD 30 MIN: CPT

## 2023-01-13 NOTE — PLAN
[FreeTextEntry1] : Chest pain likely related to radiating shoulder pain that he is experiencing episodic.  Given his cardiac risk factors suggested cardiac evaluation with possible stress test.\par Repeat lipid profile.\par Patient is getting care for his diabetes with an endocrinologist and a diabetes educator.

## 2023-01-13 NOTE — HISTORY OF PRESENT ILLNESS
[FreeTextEntry8] : Patient experienced an episode of left-sided chest pain nonradiating not associated with any other symptom.  Symptoms resolved on their own.  And patient has not experienced any other symptoms subsequently.\par His last lipid profile was showing an LDL of 115 nonfasting back in July of last year.  He is a poorly controlled diabetic followed up with endocrine.\par

## 2023-02-13 ENCOUNTER — APPOINTMENT (OUTPATIENT)
Dept: ENDOCRINOLOGY | Facility: CLINIC | Age: 63
End: 2023-02-13
Payer: COMMERCIAL

## 2023-02-13 VITALS
BODY MASS INDEX: 24.79 KG/M2 | HEIGHT: 72 IN | HEART RATE: 89 BPM | TEMPERATURE: 97.5 F | DIASTOLIC BLOOD PRESSURE: 70 MMHG | SYSTOLIC BLOOD PRESSURE: 112 MMHG | WEIGHT: 183 LBS | OXYGEN SATURATION: 97 %

## 2023-02-13 PROCEDURE — 99214 OFFICE O/P EST MOD 30 MIN: CPT

## 2023-02-13 NOTE — HISTORY OF PRESENT ILLNESS
[FreeTextEntry1] : Problems:\par 1. DM type 2\par 2. Hypertension\par 3. Hyperlipidemia\par \par \par DM type 2\par 1. Diagnosed in 2015\par 2. Meds:\par Metformin  mg po daily (has tolerable diarrhea with this - patient had intolerable diarrhea with higher doses)\par Farxiga 10 mg po daily  (No frequent UTIs or genital candidiasis).\par Ozempic 0.25 mg sc once weekly (No pancreatitis, no personal or family history of medullary thyroid cancer)\par 3. Fingersticks done 3 times per day -  patient does not want to use a CGM  - 130s to 230s, no hypoglycemic unawareness\par 4. Not on Aspirin, on atorvastatin 20 mg po daily, on lisinopril/HCTZ 20/25 mg po daily\par 5. Complications:\par No DM nephropathy (normal creatinine, neg urine microalbumin panel in 2019)\par No DM retinopathy (last eye exam was in October 2022, patient advised on the need for annual DM eye exam)\par No ASCVD\par No foot ulcers/amputation\par 6. Patient never smoked cigarettes

## 2023-02-13 NOTE — ASSESSMENT
[FreeTextEntry1] : Target: HbA1c < 7%, BP < 130/80\par \par The patient's fingersticks are improving but still elevated so I increased the dose of Ozempic.\par BP is at goal. \par \par Patient is on statin and ACEi - no indication for Aspirin.\par \par Last lipid panel - Aug 2022 - Trig 170, \par Last HbA1c - 12/06/2022 - 10.8%\par Last Vitamin B12 - None seen\par Last urine albumin panel - 2019 - Negative\par Last BMP/CMP - August 2022 - Cr, K, AST and ALT normal\par \par \par Plan:\par 1. Increase to 0.5 mg sc once weekly\par 2. Continue Farxiga 10 mg po daily\par 3. Fingersticks to be done three times daily\par 4. Labs to be done in 2 months - CBC, CMP, HbA1c, urine albumin panel, vitamin B12\par 5. Follow up in 2 months to review meter and results.

## 2023-02-13 NOTE — PHYSICAL EXAM
[de-identified] : General: No distress, well nourished\par Eyes: Normal Sclera, EOMI, PERRL\par ENT: Normal appearance of the nose, normal oropharynx, normal dentition\par Neck/Thyroid: No cervical lymphadenopathy, thyroid gland 20 g in size, no thyroid nodules, non-tender\par Respiratory: No use of accessory muscles of respiration, vesicular breath sounds heard bilaterally, no crepitations or ronchi\par Cardiovascular: S1 and S2 heard and normal, no S3 or S4, no murmurs, radial pulse normal bilaterally\par Abdomen: soft, non-tender, no masses, normal bowel sounds\par Musculoskeletal: No swelling or deformities of joints of hands, no pedal edema\par Neurological: Normal range of motion in the hands, Normal brachioradialis reflexes bilaterally\par Psychiatry: Patient converses normally, good judgement and insight\par Skin: No rashes in hands, no nodules palpated in hands  [de-identified] : DM foot exam done on 01/10/2023:\par No ulcers seen in feet\par Dorsalis pedis pulses normal bilaterally\par Sensation to 10 g monofilament normal in feet bilaterally

## 2023-03-05 ENCOUNTER — NON-APPOINTMENT (OUTPATIENT)
Age: 63
End: 2023-03-05

## 2023-03-06 ENCOUNTER — APPOINTMENT (OUTPATIENT)
Dept: ENDOCRINOLOGY | Facility: CLINIC | Age: 63
End: 2023-03-06
Payer: COMMERCIAL

## 2023-03-06 VITALS
HEIGHT: 72 IN | DIASTOLIC BLOOD PRESSURE: 86 MMHG | WEIGHT: 182 LBS | OXYGEN SATURATION: 98 % | HEART RATE: 90 BPM | BODY MASS INDEX: 24.65 KG/M2 | SYSTOLIC BLOOD PRESSURE: 130 MMHG

## 2023-03-06 LAB — HBA1C MFR BLD HPLC: 7

## 2023-03-06 PROCEDURE — 99204 OFFICE O/P NEW MOD 45 MIN: CPT

## 2023-03-06 PROCEDURE — 83036 HEMOGLOBIN GLYCOSYLATED A1C: CPT | Mod: QW

## 2023-03-06 NOTE — PHYSICAL EXAM
[Alert] : alert [Well Nourished] : well nourished [No Acute Distress] : no acute distress [Well Developed] : well developed [Normal Sclera/Conjunctiva] : normal sclera/conjunctiva [EOMI] : extra ocular movement intact [No Proptosis] : no proptosis [Normal Oropharynx] : the oropharynx was normal [Thyroid Not Enlarged] : the thyroid was not enlarged [No Thyroid Nodules] : no palpable thyroid nodules [No Respiratory Distress] : no respiratory distress [No Accessory Muscle Use] : no accessory muscle use [Clear to Auscultation] : lungs were clear to auscultation bilaterally [Normal S1, S2] : normal S1 and S2 [Normal Rate] : heart rate was normal [Regular Rhythm] : with a regular rhythm [No Edema] : no peripheral edema [Pedal Pulses Normal] : the pedal pulses are present [Normal Bowel Sounds] : normal bowel sounds [Not Tender] : non-tender [Not Distended] : not distended [Soft] : abdomen soft [Normal Anterior Cervical Nodes] : no anterior cervical lymphadenopathy [Normal Posterior Cervical Nodes] : no posterior cervical lymphadenopathy [No Spinal Tenderness] : no spinal tenderness [Spine Straight] : spine straight [No Stigmata of Cushings Syndrome] : no stigmata of Cushings Syndrome [Normal Gait] : normal gait [Normal Strength/Tone] : muscle strength and tone were normal [No Rash] : no rash [Right foot was examined, including] : right foot ~C was examined, including visual inspection with sensory and pulse exams [Left foot was examined, including] : left foot ~C was examined, including visual inspection with sensory and pulse exams [Normal] : normal [Full ROM] : with full range of motion [No Tremors] : no tremors [Normal Sensation on Monofilament Testing] : normal sensation on monofilament testing of lower extremities [Oriented x3] : oriented to person, place, and time [Acanthosis Nigricans] : no acanthosis nigricans [Diminished Throughout Both Feet] : normal tactile sensation with monofilament testing throughout both feet

## 2023-03-06 NOTE — ASSESSMENT
[Diabetes Foot Care] : diabetes foot care [Long Term Vascular Complications] : long term vascular complications of diabetes [Carbohydrate Consistent Diet] : carbohydrate consistent diet [Importance of Diet and Exercise] : importance of diet and exercise to improve glycemic control, achieve weight loss and improve cardiovascular health [Exercise/Effect on Glucose] : exercise/effect on glucose [Self Monitoring of Blood Glucose] : self monitoring of blood glucose [Retinopathy Screening] : Patient was referred to ophthalmology for retinopathy screening [Diabetic Medications] : Risks and benefits of diabetic medications were discussed [FreeTextEntry3] : No hx of pancreatitis, medullary thyroid cancer or MEN syndrome in self or family, no hx of retinopathy. Discussed side effects of GI upet and association with the aforementioned issues. discussed sglt2 inhibitor adverse effect profile including UTI,  risk, euglycemic dka. Hold SGLT2 72 hrs prior to procedures/surgery, hold while npo. [FreeTextEntry1] : 63 yo with DM2, HTN here for DM care.\par \par #Type 2 Diabetes Mellitus: goal HbA1c is <7%. Today HbA1c is 7%, much improved\par Medication changes recommended:\par - continue metformin  mg daily + farxiga 10 mg + ozempic 0.5 mg weekly \par  \par Labs ordered:\par o   check CMP, lipids, B12, UMCR yearly. \par o   check HbA1c every 3 months\par \par Education/Counseling done during this visit:\par o   SMBG testing guidelines\par o   Medications reviewed\par o   Signs/Symptoms/Treatment of hypoglycemia\par o   Encouraged 30 min exercise five days a week, portion control, carb consistent diet.\par o   Recommended yearly foot exams and home foot precautions. Monofilament exam performed on 3/6/23 was normal\par o   Recommended at least yearly ophthalmology exams or as recommended by ophtho\par  \par #HTN: Goal BP <130/80\par -Continue current antihypertensive regimen\par -UMCR: check\par  \par #HLD: Goal LDL < 100\par -Continue statin.\par -Discussed healthy dieting, increased exercise\par \par FOLLOW UP:\par Return to clinic in 3 months with CDE and 6 months with me\par

## 2023-03-06 NOTE — HISTORY OF PRESENT ILLNESS
[FreeTextEntry1] : 63 yo with DM2, HTN here for DM care.\par \par Diagnosed with preDM in . In 2022 was told A1c went up so metformin was increased. Was on metformin 500 mg daily. A1c came down to 10.8% in fall .\par No known complications.\par Mother with history of DM2 in her 70s.\par \par Current regimen: metformin  mg daily + farxiga 10 mg + ozempic 0.5 mg weekly \par cannot tolerate higher dose of metformin due to GI upset\par  \par HbA1c trend:\par 2022 A1c 10.8%-->7% in 3/6/2023\par \par ROS: Denies polyuria, polydipsia, blurry vision, floaters, numbness/tingling, foot ulcers.\par  \par Last dilated exam: needs to see \par Last podiatry exam: has not been, no issues \par  \par Previous regimens have included:\par -higher doses of metformin\par - no prior insulin\par  \par Self-Monitored Blood Glucose Log: Checks fingerstick glucose 4x daily\par Fastin, 146, 139, 117, 133, 129, 140\par Before lunch: 139, 134, 122 , 143, 145, 145\par Before bed 150, 128, 128, 129, 147 148\par \par also using freestyle maximo sample\par \par Hypoglycemia: Patient denies hypoglycemic episodes \par  \par Daily routine/meals:\par -Bfast skips or egg on almond wrap or yogurt\par -Lunch tuna fish, celergy, almond flower wrap\par -Dinner stuffed pepper or zucchini, or meatloaf, spinach, cassava chips\par -Snacks/drinks:\par tries to eat lower carb in general \par  \par Exercise: very active, does a lot of stairs \par  \par Weight trend: has lost weight 18 lbs since DM diagnosis\par \par SH:\par No etoh, drugs, or tobacco. \par Occupation:  \par wife works at [employer]\par

## 2023-03-07 LAB
CREAT SPEC-SCNC: 51 MG/DL
MICROALBUMIN 24H UR DL<=1MG/L-MCNC: <1.2 MG/DL
MICROALBUMIN/CREAT 24H UR-RTO: NORMAL MG/G

## 2023-04-28 ENCOUNTER — RX RENEWAL (OUTPATIENT)
Age: 63
End: 2023-04-28

## 2023-06-13 ENCOUNTER — APPOINTMENT (OUTPATIENT)
Dept: ENDOCRINOLOGY | Facility: CLINIC | Age: 63
End: 2023-06-13
Payer: COMMERCIAL

## 2023-06-13 VITALS — HEIGHT: 72 IN | WEIGHT: 163 LBS | BODY MASS INDEX: 22.08 KG/M2

## 2023-06-13 LAB — HBA1C MFR BLD HPLC: 6.1

## 2023-06-13 PROCEDURE — G0108 DIAB MANAGE TRN  PER INDIV: CPT

## 2023-06-13 PROCEDURE — 83036 HEMOGLOBIN GLYCOSYLATED A1C: CPT | Mod: QW

## 2023-06-16 ENCOUNTER — RX RENEWAL (OUTPATIENT)
Age: 63
End: 2023-06-16

## 2023-06-16 LAB
CHOLEST SERPL-MCNC: 142 MG/DL
HDLC SERPL-MCNC: 43 MG/DL
LDLC SERPL CALC-MCNC: 82 MG/DL
NONHDLC SERPL-MCNC: 99 MG/DL
TRIGL SERPL-MCNC: 81 MG/DL

## 2023-07-07 ENCOUNTER — TRANSCRIPTION ENCOUNTER (OUTPATIENT)
Age: 63
End: 2023-07-07

## 2023-08-21 ENCOUNTER — RX RENEWAL (OUTPATIENT)
Age: 63
End: 2023-08-21

## 2023-09-11 ENCOUNTER — APPOINTMENT (OUTPATIENT)
Dept: ENDOCRINOLOGY | Facility: CLINIC | Age: 63
End: 2023-09-11
Payer: COMMERCIAL

## 2023-09-11 VITALS
HEART RATE: 109 BPM | HEIGHT: 72 IN | OXYGEN SATURATION: 99 % | SYSTOLIC BLOOD PRESSURE: 138 MMHG | BODY MASS INDEX: 19.91 KG/M2 | DIASTOLIC BLOOD PRESSURE: 90 MMHG | WEIGHT: 147 LBS

## 2023-09-11 VITALS — DIASTOLIC BLOOD PRESSURE: 74 MMHG | SYSTOLIC BLOOD PRESSURE: 118 MMHG

## 2023-09-11 DIAGNOSIS — R63.4 ABNORMAL WEIGHT LOSS: ICD-10-CM

## 2023-09-11 PROCEDURE — 83036 HEMOGLOBIN GLYCOSYLATED A1C: CPT | Mod: QW

## 2023-09-11 PROCEDURE — 82962 GLUCOSE BLOOD TEST: CPT

## 2023-09-11 PROCEDURE — 99214 OFFICE O/P EST MOD 30 MIN: CPT | Mod: 25

## 2023-09-12 ENCOUNTER — NON-APPOINTMENT (OUTPATIENT)
Age: 63
End: 2023-09-12

## 2023-09-12 LAB
ALBUMIN SERPL ELPH-MCNC: 4.9 G/DL
ALP BLD-CCNC: 46 U/L
ALT SERPL-CCNC: 27 U/L
ANION GAP SERPL CALC-SCNC: 12 MMOL/L
AST SERPL-CCNC: 21 U/L
BILIRUB SERPL-MCNC: 1 MG/DL
BUN SERPL-MCNC: 22 MG/DL
C PEPTIDE SERPL-MCNC: 1.9 NG/ML
CALCIUM SERPL-MCNC: 10.3 MG/DL
CHLORIDE SERPL-SCNC: 97 MMOL/L
CO2 SERPL-SCNC: 28 MMOL/L
CREAT SERPL-MCNC: 0.83 MG/DL
EGFR: 99 ML/MIN/1.73M2
GLUCOSE BLDC GLUCOMTR-MCNC: 134
GLUCOSE SERPL-MCNC: 122 MG/DL
HBA1C MFR BLD HPLC: 5.6
POTASSIUM SERPL-SCNC: 4.5 MMOL/L
PROT SERPL-MCNC: 7.2 G/DL
SODIUM SERPL-SCNC: 137 MMOL/L
T4 FREE SERPL-MCNC: 1.8 NG/DL
TSH SERPL-ACNC: 1.35 UIU/ML
TTG IGA SER IA-ACNC: <1.2 U/ML
TTG IGA SER-ACNC: NEGATIVE
TTG IGG SER IA-ACNC: 4.5 U/ML
TTG IGG SER IA-ACNC: NEGATIVE

## 2023-09-14 LAB — PANC ISLET CELL AB SER QL: NORMAL

## 2023-09-21 LAB
GAD65 AB SER-MCNC: 0 NMOL/L
ISLET CELL512 AB SER-SCNC: 0 NMOL/L

## 2023-09-23 LAB — ZINC TRANSPORTER 8 AB: <15 U/ML

## 2023-10-03 ENCOUNTER — NON-APPOINTMENT (OUTPATIENT)
Age: 63
End: 2023-10-03

## 2023-10-04 ENCOUNTER — OUTPATIENT (OUTPATIENT)
Dept: OUTPATIENT SERVICES | Facility: HOSPITAL | Age: 63
LOS: 1 days | End: 2023-10-04
Payer: COMMERCIAL

## 2023-10-04 ENCOUNTER — APPOINTMENT (OUTPATIENT)
Dept: ULTRASOUND IMAGING | Facility: HOSPITAL | Age: 63
End: 2023-10-04
Payer: COMMERCIAL

## 2023-10-04 DIAGNOSIS — M25.473 EFFUSION, UNSPECIFIED ANKLE: ICD-10-CM

## 2023-10-04 PROCEDURE — 93970 EXTREMITY STUDY: CPT | Mod: 26

## 2023-10-04 PROCEDURE — 93970 EXTREMITY STUDY: CPT

## 2023-10-06 ENCOUNTER — RX RENEWAL (OUTPATIENT)
Age: 63
End: 2023-10-06

## 2023-10-16 ENCOUNTER — APPOINTMENT (OUTPATIENT)
Dept: CARDIOLOGY | Facility: CLINIC | Age: 63
End: 2023-10-16
Payer: COMMERCIAL

## 2023-10-16 ENCOUNTER — NON-APPOINTMENT (OUTPATIENT)
Age: 63
End: 2023-10-16

## 2023-10-16 VITALS
HEIGHT: 72 IN | OXYGEN SATURATION: 100 % | DIASTOLIC BLOOD PRESSURE: 76 MMHG | RESPIRATION RATE: 17 BRPM | HEART RATE: 70 BPM | SYSTOLIC BLOOD PRESSURE: 135 MMHG | BODY MASS INDEX: 20.99 KG/M2 | WEIGHT: 155 LBS

## 2023-10-16 DIAGNOSIS — R07.89 OTHER CHEST PAIN: ICD-10-CM

## 2023-10-16 PROCEDURE — 93000 ELECTROCARDIOGRAM COMPLETE: CPT

## 2023-10-16 PROCEDURE — 99204 OFFICE O/P NEW MOD 45 MIN: CPT

## 2023-10-16 RX ORDER — SEMAGLUTIDE 1.34 MG/ML
2 INJECTION, SOLUTION SUBCUTANEOUS
Qty: 4 | Refills: 3 | Status: DISCONTINUED | COMMUNITY
Start: 2023-01-10 | End: 2023-10-16

## 2023-10-18 PROBLEM — R07.89 ATYPICAL CHEST PAIN: Status: ACTIVE | Noted: 2023-01-13

## 2023-12-14 ENCOUNTER — NON-APPOINTMENT (OUTPATIENT)
Age: 63
End: 2023-12-14

## 2023-12-15 ENCOUNTER — APPOINTMENT (OUTPATIENT)
Dept: SURGERY | Facility: CLINIC | Age: 63
End: 2023-12-15
Payer: COMMERCIAL

## 2023-12-15 VITALS
WEIGHT: 146.47 LBS | SYSTOLIC BLOOD PRESSURE: 138 MMHG | TEMPERATURE: 97.5 F | HEART RATE: 99 BPM | DIASTOLIC BLOOD PRESSURE: 82 MMHG | BODY MASS INDEX: 19.84 KG/M2 | OXYGEN SATURATION: 98 % | RESPIRATION RATE: 18 BRPM | HEIGHT: 72 IN

## 2023-12-15 PROCEDURE — 99204 OFFICE O/P NEW MOD 45 MIN: CPT

## 2023-12-15 NOTE — PHYSICAL EXAM
[Normal Breath Sounds] : Normal breath sounds [Normal Heart Sounds] : normal heart sounds [de-identified] : No acute distress [de-identified] : PERRLA EOMI anicteric sclera pink moist oral mucosa [de-identified] : Supple [de-identified] : Reducible right inguinal bulge.  No obvious left groin bulge on Valsalva.  Well-healed scar on the left side. [de-identified] : Appropriate for age and gender, 2 testicles in the scrotal sac.

## 2023-12-15 NOTE — PLAN
[FreeTextEntry1] : Reducible right inguinal hernia amenable to a pure tissue repair using the Shouldice technique. Risks, benefits and contraindications were discussed with him at length, including, but not exclusive to bleeding, infection, nerve/vessel injury, surgically transecting the cremasterics including the genital branch of the gentofemoral nerve  which runs in the cremasterics, recurrance rates, chronic pain, sutures used, heart attach, stroke, blood clots, more surgery, death Preoperative labs will be ordered, including cbc, cmp, ekg, in addition to clearance from primary and other specialists as needed.  Preparations and expectations reviewed for preop, perioperative and postoperative discussed in detail, including, pain management, showering, no swimming or bathing for 10 days, follow up in my office in 2-4 weeks, diet recommendations, activity recommendations and return to work.

## 2023-12-15 NOTE — REASON FOR VISIT
[Initial Evaluation] : an initial evaluation [FreeTextEntry1] : Right groin bulge that has increased in size and discomfort especially now that he is renovating his home.  He denies any obstructive symptoms.  He desires a pure tissue repair.  He has had a prior left inguinal hernia repair done in 1987 that he has recovered well from.  And he is also had a laparoscopic cholecystectomy and appendectomy.

## 2023-12-22 ENCOUNTER — NON-APPOINTMENT (OUTPATIENT)
Age: 63
End: 2023-12-22

## 2023-12-26 ENCOUNTER — NON-APPOINTMENT (OUTPATIENT)
Age: 63
End: 2023-12-26

## 2023-12-26 ENCOUNTER — APPOINTMENT (OUTPATIENT)
Dept: INTERNAL MEDICINE | Facility: CLINIC | Age: 63
End: 2023-12-26
Payer: COMMERCIAL

## 2023-12-26 VITALS
DIASTOLIC BLOOD PRESSURE: 78 MMHG | OXYGEN SATURATION: 98 % | RESPIRATION RATE: 16 BRPM | HEIGHT: 72 IN | HEART RATE: 78 BPM | SYSTOLIC BLOOD PRESSURE: 118 MMHG | WEIGHT: 147 LBS | TEMPERATURE: 97 F | BODY MASS INDEX: 19.91 KG/M2

## 2023-12-26 DIAGNOSIS — Z23 ENCOUNTER FOR IMMUNIZATION: ICD-10-CM

## 2023-12-26 DIAGNOSIS — K40.90 UNILATERAL INGUINAL HERNIA, W/OUT OBSTRUCTION OR GANGRENE, NOT SPECIFIED AS RECURRENT: ICD-10-CM

## 2023-12-26 DIAGNOSIS — Z01.818 ENCOUNTER FOR OTHER PREPROCEDURAL EXAMINATION: ICD-10-CM

## 2023-12-26 PROCEDURE — 99203 OFFICE O/P NEW LOW 30 MIN: CPT

## 2023-12-26 NOTE — HISTORY OF PRESENT ILLNESS
[No Pertinent Cardiac History] : no history of aortic stenosis, atrial fibrillation, coronary artery disease, recent myocardial infarction, or implantable device/pacemaker [No Pertinent Pulmonary History] : no history of asthma, COPD, sleep apnea, or smoking [No Adverse Anesthesia Reaction] : no adverse anesthesia reaction in self or family member [(Patient denies any chest pain, claudication, dyspnea on exertion, orthopnea, palpitations or syncope)] : Patient denies any chest pain, claudication, dyspnea on exertion, orthopnea, palpitations or syncope [Good (7-10 METs)] : Good (7-10 METs) [Family Member] : no family member with adverse anesthesia reaction/sudden death [Self] : no previous adverse anesthesia reaction [Chronic Anticoagulation] : no chronic anticoagulation [Chronic Kidney Disease] : no chronic kidney disease [Diabetes] : no diabetes [FreeTextEntry1] : Right inguinal herniorrhaphy [FreeTextEntry2] : January 2, 2023 [FreeTextEntry3] : Sara [FreeTextEntry4] : Most pleasant 63-year-old white male  with a history of more recently well-controlled diabetes in the setting of Ozempic induced weight loss, hypertension, hyperlipidemia, left arytenoid cyst, anal fissures, left shoulder arthropathy in the setting of home renovation, who noticed enlarging right groin bulge with discomfort over the course of his several months home renovation.  He is status post left inguinal hernia repair 1987 also laparoscopic cholecystectomy and appendectomy, and tolerated general anesthesia at those times without labile hypertension (so he was told), postoperative dysphoria or PONV.  The patient has known drug allergy to penicillin. he  is not an easy bleeder, and is not anticoagulated. He  has not been on chronic immunosuppression such as chronic prednisone.He  has history of diabetes, which has improved to likely the impaired fasting close range secondary to Ozempic earlier this year, now on Farxiga and metformin. There is no apparent personal or family history of unprovoked VTE, malignant hyperthermia, glaucoma, or HIV/HBV/HCV. He  has no history of blood transfusion. There is no known history of sleep apnea, STOPBANG 2/8.  The patient denies signs and symptoms of active infection within the last 14 days, including: fever, shaking, chills, drenching sweats, new headache, sinus pressure, purulent rhinorhea ear ache, dental thermal sensitivity, sore throat, productive cough, new wheeze, abdominal pain, change in bowel or bladder habits such as diarrhea, dysuria.  The patient denies signs and symptoms of decompensated cardiopulmonary disease including new dyspnea even with exertion, wheeze, cough, exertional chest pain, PND, orthopnea, claudication, TIA or stroke.  With regards to functional capacity, patient indicates at baseline can mount 4 METS based on their ability to walk briskly at 4MPH for 20 minutes without chest pain or presyncope.  Last tetanus booster October 2019. The patient denies dentures.  RCRI= 0 points, class I risk (3.9% 30 day risk of death, MI, arrest), therefore no NTproBNP check indicated.   Also, would not modify or add beta blockers now that the patient is within the 30 day pre operative window; given lack of evidence by history, exam, chart review for suboptimally controlled angina, heart failure, and no history of prior MI.  The patient has only minor clinical predictors of increased perioperative cardiovascular risk. Therefore post op scheduled troponin, EKG surveillance is not indicated. [FreeTextEntry7] : Sinus bradycardia with occasional PVC October 2023 [FreeTextEntry6] : Positional exertional left shoulder pain deemed musculoskeletal by cardiology

## 2023-12-26 NOTE — PHYSICAL EXAM
[No Acute Distress] : no acute distress [Well Nourished] : well nourished [Well Developed] : well developed [Well-Appearing] : well-appearing [Normal Sclera/Conjunctiva] : normal sclera/conjunctiva [PERRL] : pupils equal round and reactive to light [EOMI] : extraocular movements intact [Normal Outer Ear/Nose] : the outer ears and nose were normal in appearance [Normal Oropharynx] : the oropharynx was normal [No JVD] : no jugular venous distention [No Lymphadenopathy] : no lymphadenopathy [Supple] : supple [Thyroid Normal, No Nodules] : the thyroid was normal and there were no nodules present [No Respiratory Distress] : no respiratory distress  [No Accessory Muscle Use] : no accessory muscle use [Clear to Auscultation] : lungs were clear to auscultation bilaterally [Normal Rate] : normal rate  [Regular Rhythm] : with a regular rhythm [Normal S1, S2] : normal S1 and S2 [No Murmur] : no murmur heard [No Carotid Bruits] : no carotid bruits [No Abdominal Bruit] : a ~M bruit was not heard ~T in the abdomen [No Varicosities] : no varicosities [Pedal Pulses Present] : the pedal pulses are present [No Edema] : there was no peripheral edema [No Palpable Aorta] : no palpable aorta [No Extremity Clubbing/Cyanosis] : no extremity clubbing/cyanosis [Soft] : abdomen soft [Non Tender] : non-tender [Non-distended] : non-distended [No Masses] : no abdominal mass palpated [No HSM] : no HSM [Normal Bowel Sounds] : normal bowel sounds [Normal Posterior Cervical Nodes] : no posterior cervical lymphadenopathy [Normal Anterior Cervical Nodes] : no anterior cervical lymphadenopathy [No CVA Tenderness] : no CVA  tenderness [No Spinal Tenderness] : no spinal tenderness [No Joint Swelling] : no joint swelling [Grossly Normal Strength/Tone] : grossly normal strength/tone [No Rash] : no rash [Coordination Grossly Intact] : coordination grossly intact [No Focal Deficits] : no focal deficits [Normal Gait] : normal gait [Deep Tendon Reflexes (DTR)] : deep tendon reflexes were 2+ and symmetric [Normal Affect] : the affect was normal [Normal Insight/Judgement] : insight and judgment were intact [de-identified] : Mallampati 2 [de-identified] : Able to extend neck to 80 degrees above horizontal plane

## 2023-12-27 ENCOUNTER — TRANSCRIPTION ENCOUNTER (OUTPATIENT)
Age: 63
End: 2023-12-27

## 2023-12-27 ENCOUNTER — OUTPATIENT (OUTPATIENT)
Dept: OUTPATIENT SERVICES | Facility: HOSPITAL | Age: 63
LOS: 1 days | End: 2023-12-27
Payer: COMMERCIAL

## 2023-12-27 VITALS
DIASTOLIC BLOOD PRESSURE: 80 MMHG | WEIGHT: 149.47 LBS | TEMPERATURE: 98 F | OXYGEN SATURATION: 99 % | HEIGHT: 72 IN | RESPIRATION RATE: 18 BRPM | SYSTOLIC BLOOD PRESSURE: 116 MMHG | HEART RATE: 75 BPM

## 2023-12-27 DIAGNOSIS — E11.9 TYPE 2 DIABETES MELLITUS WITHOUT COMPLICATIONS: ICD-10-CM

## 2023-12-27 DIAGNOSIS — K40.90 UNILATERAL INGUINAL HERNIA, WITHOUT OBSTRUCTION OR GANGRENE, NOT SPECIFIED AS RECURRENT: ICD-10-CM

## 2023-12-27 DIAGNOSIS — Z98.890 OTHER SPECIFIED POSTPROCEDURAL STATES: Chronic | ICD-10-CM

## 2023-12-27 DIAGNOSIS — I10 ESSENTIAL (PRIMARY) HYPERTENSION: ICD-10-CM

## 2023-12-27 DIAGNOSIS — Z01.818 ENCOUNTER FOR OTHER PREPROCEDURAL EXAMINATION: ICD-10-CM

## 2023-12-27 DIAGNOSIS — Z90.49 ACQUIRED ABSENCE OF OTHER SPECIFIED PARTS OF DIGESTIVE TRACT: Chronic | ICD-10-CM

## 2023-12-27 LAB
ALBUMIN SERPL ELPH-MCNC: 4.2 G/DL
ALP BLD-CCNC: 41 U/L
ALT SERPL-CCNC: 24 U/L
ANION GAP SERPL CALC-SCNC: 12 MMOL/L
AST SERPL-CCNC: 16 U/L
BILIRUB SERPL-MCNC: 0.7 MG/DL
BUN SERPL-MCNC: 24 MG/DL
CALCIUM SERPL-MCNC: 9.3 MG/DL
CHLORIDE SERPL-SCNC: 101 MMOL/L
CO2 SERPL-SCNC: 28 MMOL/L
CREAT SERPL-MCNC: 0.75 MG/DL
EGFR: 102 ML/MIN/1.73M2
GLUCOSE SERPL-MCNC: 119 MG/DL
POTASSIUM SERPL-SCNC: 4.2 MMOL/L
PROT SERPL-MCNC: 6.2 G/DL
SODIUM SERPL-SCNC: 140 MMOL/L

## 2023-12-27 PROCEDURE — G0463: CPT

## 2023-12-27 PROCEDURE — 36415 COLL VENOUS BLD VENIPUNCTURE: CPT

## 2023-12-27 PROCEDURE — 80048 BASIC METABOLIC PNL TOTAL CA: CPT

## 2023-12-27 PROCEDURE — 85027 COMPLETE CBC AUTOMATED: CPT

## 2023-12-27 NOTE — H&P PST ADULT - HISTORY OF PRESENT ILLNESS
Patient is a 63 year old M presents for perioperative testing for open right inguinal hernia, shouldice with Dr. Jose Antonio Ruiz scheduled for 12/28/2023. Reports right inguinal hernia for over a month, hernia is getting bigger and causing more pain. Reports hernia is reducible. Denies any acute symptoms at this time. Otherwise patient reports feeling well overall.

## 2023-12-27 NOTE — H&P PST ADULT - NSICDXPASTMEDICALHX_GEN_ALL_CORE_FT
PAST MEDICAL HISTORY:  DM (diabetes mellitus), type 2     HLD (hyperlipidemia)     Hypertension

## 2023-12-27 NOTE — H&P PST ADULT - NSANTHOSAYNRD_GEN_A_CORE
neck 15 inches/No. JOJO screening performed.  STOP BANG Legend: 0-2 = LOW Risk; 3-4 = INTERMEDIATE Risk; 5-8 = HIGH Risk

## 2023-12-27 NOTE — H&P PST ADULT - PROBLEM SELECTOR PLAN 1
Patient provided with pre-operative instructions and verbalized understanding.  Patient will be NPO on day of surgery. Patient will stop NSAIDs, aspirin, herbal supplements or vitamins 1 week prior to surgery.  Chlorhexidine wash provided with instructions, verbalized understanding. Pending medical clearance as per surgeon. Patient provided with pre-operative instructions and verbalized understanding.  Patient will be NPO on day of surgery. Patient will stop NSAIDs, aspirin, herbal supplements or vitamins 1 week prior to surgery.  Chlorhexidine wash provided with instructions, verbalized understanding.     Medical clearance in chart as per surgeon.

## 2023-12-27 NOTE — H&P PST ADULT - PROBLEM SELECTOR PLAN 2
continue medications as prescribed.    accucheck STAT DOS continue medications as prescribed.  Hold Metformin AM DOS  Hold Farxiga, last dose 12/27   accucheck STAT DOS

## 2023-12-27 NOTE — H&P PST ADULT - RESPIRATORY
clear to auscultation bilaterally/no wheezes/no rales/airway patent/breath sounds equal/good air movement

## 2023-12-27 NOTE — H&P PST ADULT - LYMPHATICS COMMENTS
no anterior cervical or supraclavicular lymphadenopathy Spironolactone Pregnancy And Lactation Text: This medication can cause feminization of the male fetus and should be avoided during pregnancy. The active metabolite is also found in breast milk.

## 2023-12-27 NOTE — H&P PST ADULT - NSICDXPASTSURGICALHX_GEN_ALL_CORE_FT
PAST SURGICAL HISTORY:  History of appendectomy     History of colonoscopy     Hx of cholecystectomy     Inguinal hernia     S/P T&A (status post tonsillectomy and adenoidectomy)     Umbilical hernia     Vocal cord cyst

## 2023-12-28 ENCOUNTER — TRANSCRIPTION ENCOUNTER (OUTPATIENT)
Age: 63
End: 2023-12-28

## 2023-12-28 ENCOUNTER — OUTPATIENT (OUTPATIENT)
Dept: OUTPATIENT SERVICES | Facility: HOSPITAL | Age: 63
LOS: 1 days | End: 2023-12-28
Payer: COMMERCIAL

## 2023-12-28 ENCOUNTER — RESULT REVIEW (OUTPATIENT)
Age: 63
End: 2023-12-28

## 2023-12-28 ENCOUNTER — APPOINTMENT (OUTPATIENT)
Dept: SURGERY | Facility: HOSPITAL | Age: 63
End: 2023-12-28

## 2023-12-28 VITALS
OXYGEN SATURATION: 99 % | HEART RATE: 62 BPM | RESPIRATION RATE: 16 BRPM | SYSTOLIC BLOOD PRESSURE: 113 MMHG | DIASTOLIC BLOOD PRESSURE: 77 MMHG | TEMPERATURE: 97 F

## 2023-12-28 VITALS
SYSTOLIC BLOOD PRESSURE: 107 MMHG | WEIGHT: 149.47 LBS | OXYGEN SATURATION: 99 % | HEART RATE: 77 BPM | HEIGHT: 72 IN | DIASTOLIC BLOOD PRESSURE: 72 MMHG | RESPIRATION RATE: 14 BRPM | TEMPERATURE: 97 F

## 2023-12-28 DIAGNOSIS — K40.90 UNILATERAL INGUINAL HERNIA, WITHOUT OBSTRUCTION OR GANGRENE, NOT SPECIFIED AS RECURRENT: ICD-10-CM

## 2023-12-28 DIAGNOSIS — Z90.49 ACQUIRED ABSENCE OF OTHER SPECIFIED PARTS OF DIGESTIVE TRACT: Chronic | ICD-10-CM

## 2023-12-28 DIAGNOSIS — Z98.890 OTHER SPECIFIED POSTPROCEDURAL STATES: Chronic | ICD-10-CM

## 2023-12-28 LAB
GLUCOSE BLDC GLUCOMTR-MCNC: 119 MG/DL — HIGH (ref 70–99)
GLUCOSE BLDC GLUCOMTR-MCNC: 119 MG/DL — HIGH (ref 70–99)
GLUCOSE BLDC GLUCOMTR-MCNC: 121 MG/DL — HIGH (ref 70–99)
GLUCOSE BLDC GLUCOMTR-MCNC: 121 MG/DL — HIGH (ref 70–99)

## 2023-12-28 PROCEDURE — 88302 TISSUE EXAM BY PATHOLOGIST: CPT | Mod: 26

## 2023-12-28 PROCEDURE — 49505 PRP I/HERN INIT REDUC >5 YR: CPT | Mod: RT

## 2023-12-28 PROCEDURE — 82962 GLUCOSE BLOOD TEST: CPT

## 2023-12-28 PROCEDURE — 88302 TISSUE EXAM BY PATHOLOGIST: CPT

## 2023-12-28 RX ORDER — DAPAGLIFLOZIN 10 MG/1
1 TABLET, FILM COATED ORAL
Refills: 0 | DISCHARGE

## 2023-12-28 RX ORDER — ATORVASTATIN CALCIUM 80 MG/1
1 TABLET, FILM COATED ORAL
Refills: 0 | DISCHARGE

## 2023-12-28 RX ORDER — ONDANSETRON 8 MG/1
4 TABLET, FILM COATED ORAL ONCE
Refills: 0 | Status: DISCONTINUED | OUTPATIENT
Start: 2023-12-28 | End: 2023-12-28

## 2023-12-28 RX ORDER — HYDROMORPHONE HYDROCHLORIDE 2 MG/ML
1 INJECTION INTRAMUSCULAR; INTRAVENOUS; SUBCUTANEOUS ONCE
Refills: 0 | Status: DISCONTINUED | OUTPATIENT
Start: 2023-12-28 | End: 2023-12-28

## 2023-12-28 RX ORDER — METFORMIN HYDROCHLORIDE 850 MG/1
1 TABLET ORAL
Refills: 0 | DISCHARGE

## 2023-12-28 RX ORDER — LISINOPRIL/HYDROCHLOROTHIAZIDE 10-12.5 MG
1 TABLET ORAL
Refills: 0 | DISCHARGE

## 2023-12-28 RX ORDER — SODIUM CHLORIDE 9 MG/ML
1000 INJECTION, SOLUTION INTRAVENOUS
Refills: 0 | Status: DISCONTINUED | OUTPATIENT
Start: 2023-12-28 | End: 2023-12-28

## 2023-12-28 RX ORDER — OXYCODONE HYDROCHLORIDE 5 MG/1
5 TABLET ORAL ONCE
Refills: 0 | Status: DISCONTINUED | OUTPATIENT
Start: 2023-12-28 | End: 2023-12-28

## 2023-12-28 RX ORDER — HYDROMORPHONE HYDROCHLORIDE 2 MG/ML
0.5 INJECTION INTRAMUSCULAR; INTRAVENOUS; SUBCUTANEOUS
Refills: 0 | Status: DISCONTINUED | OUTPATIENT
Start: 2023-12-28 | End: 2023-12-28

## 2023-12-28 RX ORDER — OXYCODONE HYDROCHLORIDE 5 MG/1
1 TABLET ORAL
Qty: 12 | Refills: 0
Start: 2023-12-28 | End: 2023-12-29

## 2023-12-28 RX ORDER — ACETAMINOPHEN 500 MG
2 TABLET ORAL
Qty: 0 | Refills: 0 | DISCHARGE

## 2023-12-28 RX ORDER — IBUPROFEN 200 MG
2 TABLET ORAL
Qty: 0 | Refills: 0 | DISCHARGE

## 2023-12-28 RX ADMIN — SODIUM CHLORIDE 50 MILLILITER(S): 9 INJECTION, SOLUTION INTRAVENOUS at 09:39

## 2023-12-28 NOTE — ASU DISCHARGE PLAN (ADULT/PEDIATRIC) - CARE PROVIDER_API CALL
Sara Samer  Surgery  [address] [place] [address], [address]  [place] [address]  Phone: [phone]  Fax: [phone]  Follow Up Time: 2 weeks   Sara Samer  Surgery  [address] [place] [address], [address]  [place] [address]  Phone: [phone]  Fax: [phone]  Follow Up Time: 2 weeks

## 2023-12-28 NOTE — ASU DISCHARGE PLAN (ADULT/PEDIATRIC) - ASU DC SPECIAL INSTRUCTIONSFT
May shower tomorrow   ice pack to groin several times a day for comfort and pain   Walk as tolerated   Take tylenol and ibuprofen every 3 hours to keep pain managed -  Start with tylenol 3 hours later take ibuprofen three hours later take tylenol keep repeating   If pain severe, take oxycodone.      If taking oxycodone , take a stool softener or laxative to avoid straining and constipation   Follow up with Dr Ruiz in 2 weeks   call office to confirm an appt.

## 2023-12-28 NOTE — ASU PATIENT PROFILE, ADULT - TEACHING/LEARNING FACTORS IMPACT ABILITY TO LEARN
no change in level of consciousness/no confusion/no dizziness/no loss of consciousness/no nausea/no seizure/no syncope/no vomiting/no weakness
none

## 2023-12-28 NOTE — BRIEF OPERATIVE NOTE - NSICDXBRIEFPROCEDURE_GEN_ALL_CORE_FT
PROCEDURES:  Repair of reducible inguinal hernia in patient 5 years of age or older 28-Dec-2023 13:12:44 right inguinal hernia shouldice repair Karlene Ocampo

## 2023-12-28 NOTE — ASU PATIENT PROFILE, ADULT - FALL HARM RISK - UNIVERSAL INTERVENTIONS
Bed in lowest position, wheels locked, appropriate side rails in place/Call bell, personal items and telephone in reach/Instruct patient to call for assistance before getting out of bed or chair/Non-slip footwear when patient is out of bed/[place] to call system/Physically safe environment - no spills, clutter or unnecessary equipment/Purposeful Proactive Rounding/Room/bathroom lighting operational, light cord in reach Bed in lowest position, wheels locked, appropriate side rails in place/Call bell, personal items and telephone in reach/Instruct patient to call for assistance before getting out of bed or chair/Non-slip footwear when patient is out of bed/[place] to call system/Physically safe environment - no spills, clutter or unnecessary equipment/Purposeful Proactive Rounding/Room/bathroom lighting operational, light cord in reach

## 2023-12-28 NOTE — ASU DISCHARGE PLAN (ADULT/PEDIATRIC) - NS MD DC FALL RISK RISK
For information on Fall & Injury Prevention, visit: [email or web address] OR  [email or web address] OR  https://www.cdc.gov/steadi/patient.html For information on Fall & Injury Prevention, visit: [email or web address] OR  [email or web address] OR  https://www.cdc.gov/steadi/patient.html

## 2023-12-28 NOTE — ASU PATIENT PROFILE, ADULT - NSICDXPASTSURGICALHX_GEN_ALL_CORE_FT
PAST SURGICAL HISTORY:  History of appendectomy     History of colonoscopy     Hx of cholecystectomy     Inguinal hernia     S/P T&A (status post tonsillectomy and adenoidectomy)     Umbilical hernia     Vocal cord cyst      PAST SURGICAL HISTORY:  History of appendectomy     History of colonoscopy     Hx of cholecystectomy     Inguinal hernia left side    S/P T&A (status post tonsillectomy and adenoidectomy)     Umbilical hernia     Vocal cord cyst removed

## 2023-12-28 NOTE — ASU DISCHARGE PLAN (ADULT/PEDIATRIC) - PROVIDER TOKENS
PROVIDER:[TOKEN:[[id number]:[id number]],FOLLOWUP:[2 weeks]] PROVIDER:[TOKEN:[[id number]:MATH:[id number]],FOLLOWUP:[2 weeks]]

## 2023-12-29 ENCOUNTER — NON-APPOINTMENT (OUTPATIENT)
Age: 63
End: 2023-12-29

## 2023-12-29 PROBLEM — E78.5 HYPERLIPIDEMIA, UNSPECIFIED: Chronic | Status: ACTIVE | Noted: 2023-12-27

## 2023-12-29 PROBLEM — E11.9 TYPE 2 DIABETES MELLITUS WITHOUT COMPLICATIONS: Chronic | Status: ACTIVE | Noted: 2023-12-27

## 2024-01-05 ENCOUNTER — APPOINTMENT (OUTPATIENT)
Dept: INTERNAL MEDICINE | Facility: CLINIC | Age: 64
End: 2024-01-05

## 2024-01-09 ENCOUNTER — APPOINTMENT (OUTPATIENT)
Dept: ENDOCRINOLOGY | Facility: CLINIC | Age: 64
End: 2024-01-09
Payer: COMMERCIAL

## 2024-01-09 VITALS
DIASTOLIC BLOOD PRESSURE: 70 MMHG | HEART RATE: 92 BPM | BODY MASS INDEX: 19.64 KG/M2 | SYSTOLIC BLOOD PRESSURE: 124 MMHG | WEIGHT: 145 LBS | HEIGHT: 72 IN | OXYGEN SATURATION: 98 %

## 2024-01-09 LAB
GLUCOSE BLDC GLUCOMTR-MCNC: 118
HBA1C MFR BLD HPLC: 5.9

## 2024-01-09 PROCEDURE — 83036 HEMOGLOBIN GLYCOSYLATED A1C: CPT | Mod: QW

## 2024-01-09 PROCEDURE — 99214 OFFICE O/P EST MOD 30 MIN: CPT | Mod: 25

## 2024-01-09 PROCEDURE — 82962 GLUCOSE BLOOD TEST: CPT

## 2024-01-09 NOTE — PHYSICAL EXAM
[Alert] : alert [No Acute Distress] : no acute distress [No Respiratory Distress] : no respiratory distress [No Accessory Muscle Use] : no accessory muscle use [Clear to Auscultation] : lungs were clear to auscultation bilaterally [Normal PMI] : the apical impulse was normal [Normal S1, S2] : normal S1 and S2 [Normal Rate] : heart rate was normal [Oriented x3] : oriented to person, place, and time [Normal Affect] : the affect was normal [Normal Insight/Judgement] : insight and judgment were intact

## 2024-01-10 NOTE — HISTORY OF PRESENT ILLNESS
[FreeTextEntry1] : Pleasant 63 year old male presents for follow up management of type 2 DM.   Diagnosed with preDM in 2016. In 11/2022 was told A1c went up so metformin was increased. Was on metformin 500 mg daily.  Previously with elevated A1c at 10.8%, A1c 9/11/2023 5.6% No known complications. Mother with history of DM2 in her 70s.  Interval history: He feels hungry all the time. energy levels are improved off of Ozempic. Hernia repair was 12/28/24, has a follow up on friday.   Current regimen: metformin  mg daily + farxiga 10 mg. Having some increased urinary frequency.   Previous medications: Ozempic 0.5 mg once weekly (was stopped due to significant weight loss) cannot tolerate higher dose of metformin due to GI upset.  Reports UTD with colonoscopy, PCP  Weight trend: December 2023:  147 pounds. The patient weighed 186 pounds in January 2023 Verbal weight at home 1/9/2024:  HbA1c trend: 12/2022 A1c 10.8%-->7% in 3/6/2023-->5.6% 9/11/23 A1c 1/9/24: 5.9%  ROS: Denies polyuria, polydipsia, blurry vision, floaters, numbness/tingling, foot ulcers.  Last dilated exam: 2023, no retinopathy (Dr. Krishnamurthy) Last podiatry exam: has not been, no issues  Glucose Monitoring: Has dexcom but needs to connect to clarity so unable to download.   Daily routine/meals: -Bfast skips or egg on almond wrap or yogurt -Lunch tuna fish, celergy, almond flower wrap -Dinner stuffed pepper or zucchini, or meatloaf, spinach, cassava chips -Snacks/drinks: tries to eat lower carb in general  Exercise: very active, does a lot of stairs  SH: No etoh, drugs, or tobacco. Occupation:  wife works at [employer]

## 2024-01-10 NOTE — ASSESSMENT
[FreeTextEntry1] : Diabetes Counseling: The patient was counseled on diabetes foot care, long term vascular complications of diabetes, carbohydrate consistent diet, importance of diet and exercise to improve glycemic control, achieve weight loss and improve cardiovascular health, exercise/effect on glucose and self monitoring of blood glucose. Patient was referred to ophthalmology for retinopathy screening. Risks and benefits of diabetic medications were discussed. No hx of pancreatitis, medullary thyroid cancer or MEN syndrome in self or family, no hx of retinopathy. Discussed side effects of GI upet and association with the aforementioned issues. discussed sglt2 inhibitor adverse effect profile including UTI,  risk, euglycemic dka. Hold SGLT2 72 hrs prior to procedures/surgery, hold while npo.  64 yo with DM2, HTN here for DM care.  #Type 2 Diabetes Mellitus: goal HbA1c is <7%.  HbA1c  5.6% 9/2023--> A1c 1/9/24: 5.9%  Plan: Continue metformin 500 mg daily, continue farxiga 10 mg daily. Pending labs, we can increase Metformin, and Decrease Farxiga to 5 mg to offset urinary symptoms, based on patients preference. My preference would be for the patient to be able to consume 45-60 grams of complex carbs with his meals, which may help him with cravings and constantly feeling hungry, and then adjusting his medications to allow for this.  Given his current BMI at 19, would want to avoid any further weight loss.    He is back to being very sensitive to carbs, stress spikes his sugar as well.  - Continue off ozempic due to significant weight loss - alternatives to consider if another agent is needed off glp1: actos, trulicity (weight loss still possible ), dpp4. vs if pt wants to try BID metformin again  Labs ordered: o check CMP, lipids, B12, UMCR yearly. o check HbA1c every 3-6 months  Education/Counseling done during this visit: o SMBG testing guidelines o Medications reviewed o Signs/Symptoms/Treatment of hypoglycemia o Encouraged 30 min exercise five days a week, portion control, carb consistent diet. o Recommended yearly foot exams and home foot precautions. Monofilament exam performed on 3/6/23 was normal o Recommended at least yearly ophthalmology exams or as recommended by ophtho  #HTN: Goal BP <130/80 -Continue current antihypertensive regimen -UMCR normal 3/2023  #HLD: Goal LDL < 100 -Continue statin. -continue healthy dieting, exercise -LDL 82 6/12/23 -Presently taking Atorvastatin.   #weight loss Thyroid functions, celiac panel, pancreatic autoantibodies were all normal. - f/u with PCP -Ozempic was stopped at the last visit.

## 2024-01-11 LAB
SURGICAL PATHOLOGY STUDY: SIGNIFICANT CHANGE UP
SURGICAL PATHOLOGY STUDY: SIGNIFICANT CHANGE UP

## 2024-01-12 ENCOUNTER — APPOINTMENT (OUTPATIENT)
Dept: SURGERY | Facility: CLINIC | Age: 64
End: 2024-01-12
Payer: COMMERCIAL

## 2024-01-12 VITALS
HEART RATE: 73 BPM | DIASTOLIC BLOOD PRESSURE: 72 MMHG | BODY MASS INDEX: 19.64 KG/M2 | TEMPERATURE: 96.6 F | HEIGHT: 72 IN | RESPIRATION RATE: 16 BRPM | SYSTOLIC BLOOD PRESSURE: 110 MMHG | WEIGHT: 145 LBS | OXYGEN SATURATION: 97 %

## 2024-01-12 DIAGNOSIS — Z09 ENCOUNTER FOR FOLLOW-UP EXAMINATION AFTER COMPLETED TREATMENT FOR CONDITIONS OTHER THAN MALIGNANT NEOPLASM: ICD-10-CM

## 2024-01-12 LAB
ALBUMIN SERPL ELPH-MCNC: 4.4 G/DL
ALP BLD-CCNC: 42 U/L
ALT SERPL-CCNC: 21 U/L
ANION GAP SERPL CALC-SCNC: 10 MMOL/L
AST SERPL-CCNC: 14 U/L
BILIRUB SERPL-MCNC: 0.4 MG/DL
BUN SERPL-MCNC: 23 MG/DL
C PEPTIDE SERPL-MCNC: 2 NG/ML
CALCIUM SERPL-MCNC: 9.6 MG/DL
CHLORIDE SERPL-SCNC: 103 MMOL/L
CHOLEST SERPL-MCNC: 157 MG/DL
CO2 SERPL-SCNC: 28 MMOL/L
CREAT SERPL-MCNC: 0.74 MG/DL
EGFR: 102 ML/MIN/1.73M2
GLUCOSE SERPL-MCNC: 135 MG/DL
HDLC SERPL-MCNC: 50 MG/DL
LDLC SERPL CALC-MCNC: 95 MG/DL
NONHDLC SERPL-MCNC: 108 MG/DL
POTASSIUM SERPL-SCNC: 4.4 MMOL/L
PROT SERPL-MCNC: 6.2 G/DL
SODIUM SERPL-SCNC: 142 MMOL/L
T4 FREE SERPL-MCNC: 1.6 NG/DL
TRIGL SERPL-MCNC: 63 MG/DL
TSH SERPL-ACNC: 1.2 UIU/ML

## 2024-01-12 PROCEDURE — 99024 POSTOP FOLLOW-UP VISIT: CPT

## 2024-01-12 NOTE — CONSULT LETTER
[Dear  ___] : Dear  [unfilled], [Courtesy Letter:] : I had the pleasure of seeing your patient, [unfilled], in my office today. [Please see my note below.] : Please see my note below. [Sincerely,] : Sincerely, [FreeTextEntry3] :  Jose Antonio Ruiz MD, ARIANNA, FACS Chair of Surgery [facility] [phone]

## 2024-01-12 NOTE — REASON FOR VISIT
[Post Op: _________] : a [unfilled] post op visit [FreeTextEntry1] : Status post open right inguinal hernia repair doing well.  Has no pain today.

## 2024-01-12 NOTE — PHYSICAL EXAM
[Normal Breath Sounds] : Normal breath sounds [Normal Heart Sounds] : normal heart sounds [de-identified] : No acute distress [de-identified] : PERRLA EOMI anicteric sclera pink moist oral mucosa [de-identified] : Supple [de-identified] : Right groin wound healing nicely.  No obvious signs of infection.  Healing ridge present and appropriate. [de-identified] : Genitalia appropriate for postop findings

## 2024-01-14 ENCOUNTER — NON-APPOINTMENT (OUTPATIENT)
Age: 64
End: 2024-01-14

## 2024-02-27 ENCOUNTER — TRANSCRIPTION ENCOUNTER (OUTPATIENT)
Age: 64
End: 2024-02-27

## 2024-03-01 RX ORDER — BLOOD SUGAR DIAGNOSTIC
STRIP MISCELLANEOUS
Qty: 100 | Refills: 3 | Status: ACTIVE | COMMUNITY
Start: 2024-03-01 | End: 1900-01-01

## 2024-03-01 RX ORDER — BLOOD-GLUCOSE METER
W/DEVICE EACH MISCELLANEOUS
Qty: 1 | Refills: 0 | Status: ACTIVE | COMMUNITY
Start: 2024-03-01 | End: 1900-01-01

## 2024-03-01 RX ORDER — LANCETS 33 GAUGE
EACH MISCELLANEOUS
Qty: 100 | Refills: 3 | Status: ACTIVE | COMMUNITY
Start: 2024-03-01 | End: 1900-01-01

## 2024-03-01 RX ORDER — BLOOD-GLUCOSE CONTROL, NORMAL
EACH MISCELLANEOUS
Qty: 1 | Refills: 0 | Status: ACTIVE | COMMUNITY
Start: 2024-03-01 | End: 1900-01-01

## 2024-03-07 NOTE — ASSESSMENT
[Patient Optimized for Surgery] : Patient optimized for surgery [No Further Testing Recommended] : no further testing recommended [FreeTextEntry4] : Patient with excellent functional capacity no signs or symptoms of active infection or decompensated cardiopulmonary disease who has tolerated anesthesia in the past without complication deemed optimized for the contemplated procedure.  I reviewed his EKG and CBC CMP identified no concerns. carried

## 2024-03-15 ENCOUNTER — APPOINTMENT (OUTPATIENT)
Dept: INTERNAL MEDICINE | Facility: CLINIC | Age: 64
End: 2024-03-15
Payer: COMMERCIAL

## 2024-03-15 VITALS
SYSTOLIC BLOOD PRESSURE: 122 MMHG | HEIGHT: 72 IN | DIASTOLIC BLOOD PRESSURE: 80 MMHG | TEMPERATURE: 97.6 F | HEART RATE: 83 BPM | BODY MASS INDEX: 19.37 KG/M2 | OXYGEN SATURATION: 98 % | WEIGHT: 143 LBS

## 2024-03-15 DIAGNOSIS — E78.5 HYPERLIPIDEMIA, UNSPECIFIED: ICD-10-CM

## 2024-03-15 DIAGNOSIS — Z00.00 ENCOUNTER FOR GENERAL ADULT MEDICAL EXAMINATION W/OUT ABNORMAL FINDINGS: ICD-10-CM

## 2024-03-15 PROCEDURE — 99215 OFFICE O/P EST HI 40 MIN: CPT

## 2024-03-15 PROCEDURE — G2211 COMPLEX E/M VISIT ADD ON: CPT

## 2024-03-15 NOTE — PHYSICAL EXAM
[Kyphosis] : kyphosis [Normal] : no rash [de-identified] : Prominent xiphoid [de-identified] : Almost cachectic

## 2024-03-15 NOTE — ASSESSMENT
[FreeTextEntry1] : *type 2 diabetes.  Diagnosed 2018, 11.1 highest A1c on record in , currently very tight control A1c 5.9, on Farxiga 5 metformin 500 twice daily. Denies retinopathy neuropathy symptoms, last eye check 2023 without retinopathy. Achieved substantial weight loss with Ozempic stopped 2023, diet, exercise, 209 pounds down to 145 pounds currently. update microalbumin" check 2024.  Update B12 given metformin. with his weight loss wonder if he could be weaned from metformin even.  *Hypertension.  Lisinopril-HCTZ 20-25 with good control.   normal BMP, update microalbumin.  *Hyperlipidemia.  Atorvastatin 20 mg.   HDL 43 LDL 82,  HDL 50 LDL 95 normal AST ALT. check LP(a)  *Routine adult health maintenance Vaccinations: Td , Prevnar not indicated, Shingrix eligible, flu up-to-date eligible for latest COVID booster which we recommended.   Colon cancer screening: No family history colorectal cancer.  First colonoscopy 2022 multiple tubular adenomatous polyps 3-8 mm, repeat 2025. Prostate cancer screenin.6 , update.  PSA AAA screening: 2019 aorta ultrasound negative for aneurysm size not quantified, family history of AAA.  Recheck age 66. Normal TSH free T4 2024. One-time screen for HIV HCV exposure per Best practices.  Update CBC vitamin D  PSA LP(a) microalbumin.  It was a pleasure to visit with Mr. Mathews today.  Answered all questions as best I could. Disposition call with results. F/U 6 months. Time 40 min

## 2024-03-15 NOTE — HISTORY OF PRESENT ILLNESS
[FreeTextEntry1] : CPE [de-identified] : Most pleasant 63-year-old white male  with a history of more recently well-controlled diabetes in the setting of Ozempic induced weight loss, hypertension, hyperlipidemia, left arytenoid cyst, anal fissures, left shoulder arthropathy in the setting of home renovation, bilateral inguinal hernia repair status post right repair January 2024, who attends for CPE.  He has no acute concerns.

## 2024-03-15 NOTE — HEALTH RISK ASSESSMENT
[Excellent] : ~his/her~  mood as  excellent [No] : No [No falls in past year] : Patient reported no falls in the past year [0] : 2) Feeling down, depressed, or hopeless: Not at all (0) [PHQ-2 Negative - No further assessment needed] : PHQ-2 Negative - No further assessment needed [Patient declined PAP Smear] : Patient declined PAP Smear [Patient declined mammogram] : Patient declined mammogram [Patient reported colonoscopy was abnormal] : Patient reported colonoscopy was abnormal [Patient declined bone density test] : Patient declined bone density test [Hepatitis C test offered] : Hepatitis C test offered [HIV Test offered] : HIV Test offered [None] : None [Employed] : employed [With Family] : lives with family [College] : College [] :  [Sexually Active] : sexually active [Feels Safe at Home] : Feels safe at home [Fully functional (bathing, dressing, toileting, transferring, walking, feeding)] : Fully functional (bathing, dressing, toileting, transferring, walking, feeding) [Fully functional (using the telephone, shopping, preparing meals, housekeeping, doing laundry, using] : Fully functional and needs no help or supervision to perform IADLs (using the telephone, shopping, preparing meals, housekeeping, doing laundry, using transportation, managing medications and managing finances) [Reports normal functional visual acuity (ie: able to read med bottle)] : Reports normal functional visual acuity [Smoke Detector] : smoke detector [Carbon Monoxide Detector] : carbon monoxide detector [Seat Belt] :  uses seat belt [Safety elements used in home] : safety elements used in home [Sunscreen] : uses sunscreen [Never] : Never [Audit-CScore] : 0 [de-identified] : Active renovating his house [de-identified] : Extremely healthy [[id number]] : 0 [[place] Centergo] : 9 [Change in mental status noted] : No change in mental status noted [Language] : denies difficulty with language [Behavior] : denies difficulty with behavior [Learning/Retaining New Information] : denies difficulty learning/retaining new information [Handling Complex Tasks] : denies difficulty handling complex tasks [Reasoning] : denies difficulty with reasoning [Spatial Ability and Orientation] : denies difficulty with spatial ability and orientation [High Risk Behavior] : no high risk behavior [Reports changes in vision] : Reports no changes in vision [Reports changes in hearing] : Reports no changes in hearing [Reports changes in dental health] : Reports no changes in dental health [Guns at Home] : no guns at home [Travel to Developing Areas] : does not  travel to developing areas [TB Exposure] : is not being exposed to tuberculosis [Caregiver Concerns] : does not have caregiver concerns [ColonoscopyDate] : 03/22 [ColonoscopyComments] : Multiple adenomas [FreeTextEntry2] :

## 2024-04-02 ENCOUNTER — RX RENEWAL (OUTPATIENT)
Age: 64
End: 2024-04-02

## 2024-04-02 ENCOUNTER — TRANSCRIPTION ENCOUNTER (OUTPATIENT)
Age: 64
End: 2024-04-02

## 2024-04-08 ENCOUNTER — NON-APPOINTMENT (OUTPATIENT)
Age: 64
End: 2024-04-08

## 2024-04-09 ENCOUNTER — TRANSCRIPTION ENCOUNTER (OUTPATIENT)
Age: 64
End: 2024-04-09

## 2024-04-10 ENCOUNTER — LABORATORY RESULT (OUTPATIENT)
Age: 64
End: 2024-04-10

## 2024-04-14 LAB
25(OH)D3 SERPL-MCNC: 44.9 NG/ML
APO LP(A) SERPL-MCNC: 18.5 NMOL/L
PSA FREE FLD-MCNC: 35 %
PSA FREE SERPL-MCNC: 0.37 NG/ML
PSA SERPL-MCNC: 1.06 NG/ML

## 2024-04-15 ENCOUNTER — APPOINTMENT (OUTPATIENT)
Dept: UROLOGY | Facility: CLINIC | Age: 64
End: 2024-04-15
Payer: COMMERCIAL

## 2024-04-15 VITALS
DIASTOLIC BLOOD PRESSURE: 70 MMHG | HEIGHT: 72 IN | WEIGHT: 149 LBS | HEART RATE: 74 BPM | TEMPERATURE: 98.5 F | OXYGEN SATURATION: 100 % | SYSTOLIC BLOOD PRESSURE: 115 MMHG | BODY MASS INDEX: 20.18 KG/M2

## 2024-04-15 DIAGNOSIS — R35.1 NOCTURIA: ICD-10-CM

## 2024-04-15 DIAGNOSIS — R35.0 FREQUENCY OF MICTURITION: ICD-10-CM

## 2024-04-15 PROCEDURE — 99204 OFFICE O/P NEW MOD 45 MIN: CPT

## 2024-04-15 RX ORDER — TAMSULOSIN HYDROCHLORIDE 0.4 MG/1
0.4 CAPSULE ORAL
Qty: 90 | Refills: 3 | Status: ACTIVE | COMMUNITY
Start: 2024-04-15 | End: 1900-01-01

## 2024-04-15 RX ADMIN — TAMSULOSIN HYDROCHLORIDE 1 MG: 0.4 CAPSULE ORAL at 00:00

## 2024-04-15 NOTE — REVIEW OF SYSTEMS
[see HPI] : see HPI [Negative] : Heme/Lymph [Wake up at night to urinate  How many times?  ___] : wakes up to urinate [unfilled] times during the night [Strong urge to urinate] : strong urge to urinate [Strain or push to urinate] : strain or push to urinate [Leakage of urine with urgency] : leakage of urine with urgency [FreeTextEntry2] : Frequent urination

## 2024-04-15 NOTE — ASSESSMENT
[FreeTextEntry1] : 62 yo m with lower urinary tract symptoms such as urinary freq and urgency, occasional urinary incontinence  -UA/Ucx to assess for glucosuria  -Lifestyle modifications such as limiting caffeine, seltzer and artificial sweetener and alcohol, avoiding nocturnal fluid intake, avoiding spicy foods, and ensuring daily BM were discussed. -Recommend [facility] [address] Kidney Bladder  -Trial Flomax 0.4mg qhs - the patient understands that this medication may cause dizziness, retrograde ejaculation or nasal congestion among other complications. I recommended that the patient take this medication at night. If the side effects become too bothersome, the medication can be discontinued. -Flow/PVR at next visit.  Patient did not have the urge to urinate at this time.   Anila Montano MD Chief of Urology, [address], [facility] [address] [place] [address] Phone: [phone] Fax: [phone] ACP

## 2024-04-15 NOTE — HISTORY OF PRESENT ILLNESS
[Urinary Incontinence] : urinary incontinence [Urinary Urgency] : urinary urgency [Urinary Frequency] : urinary frequency [Nocturia] : nocturia [FreeTextEntry1] : Referring Provider: Dr. 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 Reports a strong stream. States his whole life he has had a "small bladder" and always urinated more frequently that his peers.  He feels he completely empties his bladder. Has daily bms.  Caffeine - decaf in am 2-3 cups, sugar free drinks with aspartame, denies etoh, no spicy foods.  To help manage his urinary frequency, his farziga was recently cut in half.  He has 60 lb wt loss with Ozempic.  His urinary symptoms are creating such a bother in his personal life.  He expects to be on a 4-hour boat ride without a bathroom and has purchased adult diapers for himself due to fear of incontinence.   Denies fevers, chills, flank pain, sensation of retention, gross hematuria, history of stones, history of UTI   PSA Hx 1.06     04/10/2024 1.57     08/01/2022 1.09     03/24/2018  Biopsy Hx none MRI Hx none     04/15/2024 IPSS 11 QOL 5 ESTEVAN 25    PMHx: Type 2 diabetes SxHx: Hernia repair 2023 FHx: Denies  malignancy SocHx:  with wife; caffeine - decaf in am 2-3 cups, sugar free drinks with aspartame, denies etoh, no spicy foods  Allergies: pcn rash    The patient denies fevers, chills, nausea and or vomiting and no unexplained weight loss. All pertinent laboratory, films and physician notes were reviewed.  Questionnaire results were discussed with patient. [Urinary Retention] : no urinary retention [Straining] : no straining [Weak Stream] : no weak stream

## 2024-04-17 ENCOUNTER — NON-APPOINTMENT (OUTPATIENT)
Age: 64
End: 2024-04-17

## 2024-04-17 ENCOUNTER — APPOINTMENT (OUTPATIENT)
Dept: ENDOCRINOLOGY | Facility: CLINIC | Age: 64
End: 2024-04-17
Payer: COMMERCIAL

## 2024-04-17 VITALS
OXYGEN SATURATION: 98 % | SYSTOLIC BLOOD PRESSURE: 110 MMHG | HEART RATE: 84 BPM | HEIGHT: 72 IN | BODY MASS INDEX: 20.05 KG/M2 | WEIGHT: 148 LBS | DIASTOLIC BLOOD PRESSURE: 70 MMHG

## 2024-04-17 DIAGNOSIS — I10 ESSENTIAL (PRIMARY) HYPERTENSION: ICD-10-CM

## 2024-04-17 DIAGNOSIS — E11.9 TYPE 2 DIABETES MELLITUS W/OUT COMPLICATIONS: ICD-10-CM

## 2024-04-17 PROCEDURE — 95251 CONT GLUC MNTR ANALYSIS I&R: CPT

## 2024-04-17 PROCEDURE — G2211 COMPLEX E/M VISIT ADD ON: CPT

## 2024-04-17 PROCEDURE — 99214 OFFICE O/P EST MOD 30 MIN: CPT

## 2024-04-17 NOTE — HISTORY OF PRESENT ILLNESS
[FreeTextEntry1] : Pleasant 63 year old male presents for follow up management of type 2 DM.   Diagnosed with preDM in 2016. In 2022 was told A1c went up so metformin was increased. Was on metformin 500 mg daily.  Previously with elevated A1c at 10.8%, A1c 2023 5.6% No known complications. Mother with history of DM2 in her 70s.  Interval history: He feels hungry all the time. energy levels are improved off of Ozempic. Hernia repair was 24.  Current regimen: Metformin 500 mg twice daily and farxiga 5 mg once daily.  At the last visit we reduced farxiga and increase metformin to see if it had a impact on his urinary symptoms.  Previous medications: Ozempic 0.5 mg once weekly (was stopped due to significant weight loss) cannot tolerate higher dose of metformin due to GI upset.  Glucose monitoring: Dexcom, in range 99% of the time  Reports UTD with colonoscopy, PCP  Weight trend: 2023:  147 pounds. The patient weighed 186 pounds in 2023  HbA1c trend: 2022 A1c 10.8%-->7% in 3/6/2023-->5.6% 23 A1c 24: 5.9% A1c 4/10/24: 6%  ROS: Denies polyuria, polydipsia, blurry vision, floaters, numbness/tingling, foot ulcers.  Last dilated exam: , no retinopathy (Dr. Krishnamurthy) Last podiatry exam: has not been, no issues  Glucose Monitorin% in range.   Daily routine/meals: -Bfast skips or egg on almond wrap or yogurt -Lunch tuna fish, celergy, almond flower wrap -Dinner stuffed pepper or zucchini, or meatloaf, spinach, cassava chips -Snacks/drinks: tries to eat lower carb in general  Exercise: very active, does a lot of stairs  SH: No etoh, drugs, or tobacco. Occupation:  wife works at [employer]

## 2024-04-17 NOTE — ASSESSMENT
[FreeTextEntry1] : Diabetes Counseling: The patient was counseled on diabetes foot care, long term vascular complications of diabetes, carbohydrate consistent diet, importance of diet and exercise to improve glycemic control, achieve weight loss and improve cardiovascular health, exercise/effect on glucose and self monitoring of blood glucose. Patient was referred to ophthalmology for retinopathy screening. Risks and benefits of diabetic medications were discussed. No hx of pancreatitis, medullary thyroid cancer or MEN syndrome in self or family, no hx of retinopathy. Discussed side effects of GI upet and association with the aforementioned issues. discussed sglt2 inhibitor adverse effect profile including UTI,  risk, euglycemic dka. Hold SGLT2 72 hrs prior to procedures/surgery, hold while npo.  62 yo with DM2, HTN here for DM care.  Normal labs in January with normal insulin production levels.  #Type 2 Diabetes Mellitus: goal HbA1c is <7%.  HbA1c  5.6% 9/2023--> A1c 1/9/24: 5.9%--> A1c 4/10/24 6% Glucose monitoring: Dexcom, in range 99% of the time  Plan: Metformin 500 mg twice daily and farxiga 5 mg once daily. Urinary symptoms have not totally resolved. Urologist BPH, remove irritants. Pending UA/culture results.   My preference would be for the patient to be able to consume 45-60 grams of complex carbs with his meals, which may help him with cravings and constantly feeling hungry, and then adjusting his medications to allow for this.  Given his current BMI at 19, would want to avoid any further weight loss.    He is back to being very sensitive to carbs, stress spikes his sugar as well.  - Continue off ozempic due to significant weight loss - alternatives to consider if another agent is needed off glp1: actos, trulicity (weight loss still possible ), dpp4.   Labs ordered: o check CMP, lipids, B12, UMCR yearly. o check HbA1c every 3-6 months  Education/Counseling done during this visit: o SMBG testing guidelines o Medications reviewed o Signs/Symptoms/Treatment of hypoglycemia o Encouraged 30 min exercise five days a week, portion control, carb consistent diet. o Recommended yearly foot exams and home foot precautions. Monofilament exam performed on 3/6/23 was normal o Recommended at least yearly ophthalmology exams or as recommended by ophtho  #HTN: Goal BP <130/80 -Continue current antihypertensive regimen -UMCR normal 1/10/2024  #HLD: Goal LDL < 100 -Continue statin. -continue healthy dieting, exercise -LDL 95 1/2024 -Presently taking Atorvastatin.   #weight loss Thyroid functions, celiac panel, pancreatic autoantibodies were all normal. - f/u with PCP -Ozempic was stopped at the last visit.

## 2024-04-19 LAB
APPEARANCE: CLEAR
BACTERIA UR CULT: NORMAL
BACTERIA: NEGATIVE /HPF
BILIRUBIN URINE: NEGATIVE
BLOOD URINE: NEGATIVE
CAST: 0 /LPF
COLOR: YELLOW
EPITHELIAL CELLS: 0 /HPF
GLUCOSE QUALITATIVE U: >=1000 MG/DL
KETONES URINE: NEGATIVE MG/DL
LEUKOCYTE ESTERASE URINE: NEGATIVE
MICROSCOPIC-UA: NORMAL
NITRITE URINE: NEGATIVE
PH URINE: 7.5
PROTEIN URINE: NEGATIVE MG/DL
RED BLOOD CELLS URINE: 1 /HPF
SPECIFIC GRAVITY URINE: 1.02
UROBILINOGEN URINE: 0.2 MG/DL
WHITE BLOOD CELLS URINE: 0 /HPF

## 2024-04-22 ENCOUNTER — RX RENEWAL (OUTPATIENT)
Age: 64
End: 2024-04-22

## 2024-04-22 RX ORDER — SEMAGLUTIDE 0.68 MG/ML
2 INJECTION, SOLUTION SUBCUTANEOUS
Qty: 9 | Refills: 1 | Status: ACTIVE | COMMUNITY
Start: 2023-04-28 | End: 1900-01-01

## 2024-05-22 ENCOUNTER — APPOINTMENT (OUTPATIENT)
Dept: UROLOGY | Facility: CLINIC | Age: 64
End: 2024-05-22
Payer: COMMERCIAL

## 2024-05-22 VITALS
TEMPERATURE: 98 F | HEART RATE: 78 BPM | DIASTOLIC BLOOD PRESSURE: 70 MMHG | HEIGHT: 72 IN | SYSTOLIC BLOOD PRESSURE: 110 MMHG | BODY MASS INDEX: 20.05 KG/M2 | OXYGEN SATURATION: 97 % | WEIGHT: 148 LBS

## 2024-05-22 DIAGNOSIS — R39.15 URGENCY OF URINATION: ICD-10-CM

## 2024-05-22 PROCEDURE — 99213 OFFICE O/P EST LOW 20 MIN: CPT

## 2024-05-22 NOTE — PHYSICAL EXAM
[General Appearance - Well Developed] : well developed [General Appearance - Well Nourished] : well nourished [Normal Appearance] : normal appearance [Well Groomed] : well groomed [General Appearance - In No Acute Distress] : no acute distress [Edema] : no peripheral edema [Respiration, Rhythm And Depth] : normal respiratory rhythm and effort [Exaggerated Use Of Accessory Muscles For Inspiration] : no accessory muscle use [Abdomen Soft] : soft [Abdomen Tenderness] : non-tender [Urinary Bladder Findings] : the bladder was normal on palpation [Normal Station and Gait] : the gait and station were normal for the patient's age [] : no rash [No Focal Deficits] : no focal deficits [Oriented To Time, Place, And Person] : oriented to person, place, and time [Affect] : the affect was normal [Mood] : the mood was normal [No Palpable Adenopathy] : no palpable adenopathy

## 2024-05-22 NOTE — ASSESSMENT
[FreeTextEntry1] : 62 yo m with lower urinary tract symptoms such as urinary freq and urgency, occasional urinary incontinence  -UA/Ucx reviewed -patient has significant glucosuria -Lifestyle modifications such as limiting caffeine, seltzer and artificial sweetener and alcohol, avoiding nocturnal fluid intake, avoiding spicy foods, and ensuring daily BM were discussed. -Recommend [facility] Kidney Bladder and Azo bladder control -Patient would like to hold off on a trial of Flomax at this period  -Flow/PVR if needed at next visit. Patient did not have the urge to urinate at this time. -Discussed PSA screening every 6 to 12 months  Patient will follow-up as needed if urgency continues   Anila Montano MD Chief of Urology, [address], [facility] [address] [place] [address] Phone: [phone] Fax: [phone]

## 2024-05-22 NOTE — HISTORY OF PRESENT ILLNESS
[FreeTextEntry1] : Referring Provider: Dr. 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 Reports a strong stream. States his whole life he has had a "small bladder" and always urinated more frequently that his peers. He feels he completely empties his bladder. Has daily bms. Caffeine - decaf in am 2-3 cups, sugar free drinks with aspartame, denies etoh, no spicy foods. To help manage his urinary frequency, his farziga was recently cut in half. He has 60 lb wt loss with Ozempic. His urinary symptoms are creating such a bother in his personal life. He expects to be on a 4-hour boat ride without a bathroom and has purchased adult diapers for himself due to fear of incontinence.  As of 5/22/2024, patient states that he feels that his urinary symptoms have somewhat improved.  He feels like his urgency is related to bladder irritants.  He also has noticed that the urgency started around this time that he started the Farxiga.  He has not tried the alpha-blocker as of yet.  Denies fevers, chills, flank pain, sensation of retention, gross hematuria, history of stones, history of UTI  PSA Hx 1.06 04/10/2024 1.57 08/01/2022 1.09 03/24/2018  Biopsy Hx none MRI Hx none  05/22/24 IPSS 13 QOL 3-4 ESTEVAN 25 Flow/PVR: qmax =  ml/s, avg =  ml/s, VV =  ml, PVR =  ml   04/15/2024 IPSS 11 QOL 5 ESTEVAN 25   PMHx: Type 2 diabetes SxHx: Hernia repair 2023 FHx: Denies  malignancy SocHx:  with wife; caffeine - decaf in am 2-3 cups, sugar free drinks with aspartame, denies etoh, no spicy foods Allergies: pcn rash  The patient denies fevers, chills, nausea and or vomiting and no unexplained weight loss. All pertinent laboratory, films and physician notes were reviewed. Questionnaire results were discussed with patient.   Patient is currently experiencing urinary incontinence, urinary urgency, urinary frequency and nocturia, but no urinary retention, no straining and no weak stream.

## 2024-06-05 ENCOUNTER — RX RENEWAL (OUTPATIENT)
Age: 64
End: 2024-06-05

## 2024-06-05 RX ORDER — BLOOD-GLUCOSE,RECEIVER,CONT
EACH MISCELLANEOUS
Qty: 1 | Refills: 0 | Status: ACTIVE | COMMUNITY
Start: 2023-03-06 | End: 1900-01-01

## 2024-06-05 RX ORDER — BLOOD-GLUCOSE SENSOR
EACH MISCELLANEOUS
Qty: 9 | Refills: 2 | Status: ACTIVE | COMMUNITY
Start: 2023-03-06 | End: 1900-01-01

## 2024-06-24 ENCOUNTER — RX RENEWAL (OUTPATIENT)
Age: 64
End: 2024-06-24

## 2024-06-24 RX ORDER — DAPAGLIFLOZIN 5 MG/1
5 TABLET, FILM COATED ORAL DAILY
Qty: 90 | Refills: 0 | Status: ACTIVE | COMMUNITY
Start: 2022-12-13 | End: 1900-01-01

## 2024-06-25 ENCOUNTER — RX RENEWAL (OUTPATIENT)
Age: 64
End: 2024-06-25

## 2024-06-25 RX ORDER — METFORMIN ER 500 MG 500 MG/1
500 TABLET ORAL TWICE DAILY
Qty: 180 | Refills: 0 | Status: ACTIVE | COMMUNITY
Start: 2022-12-13 | End: 1900-01-01

## 2024-07-01 ENCOUNTER — APPOINTMENT (OUTPATIENT)
Dept: SURGERY | Facility: CLINIC | Age: 64
End: 2024-07-01

## 2024-07-01 VITALS
OXYGEN SATURATION: 98 % | RESPIRATION RATE: 17 BRPM | HEART RATE: 82 BPM | TEMPERATURE: 98 F | DIASTOLIC BLOOD PRESSURE: 78 MMHG | HEIGHT: 72 IN | SYSTOLIC BLOOD PRESSURE: 120 MMHG

## 2024-07-01 PROCEDURE — 99213 OFFICE O/P EST LOW 20 MIN: CPT

## 2024-07-21 ENCOUNTER — NON-APPOINTMENT (OUTPATIENT)
Age: 64
End: 2024-07-21

## 2024-08-20 ENCOUNTER — APPOINTMENT (OUTPATIENT)
Dept: ENDOCRINOLOGY | Facility: CLINIC | Age: 64
End: 2024-08-20
Payer: COMMERCIAL

## 2024-08-20 VITALS
SYSTOLIC BLOOD PRESSURE: 120 MMHG | HEIGHT: 72 IN | OXYGEN SATURATION: 98 % | HEART RATE: 80 BPM | BODY MASS INDEX: 19.5 KG/M2 | DIASTOLIC BLOOD PRESSURE: 78 MMHG | WEIGHT: 144 LBS

## 2024-08-20 DIAGNOSIS — R63.4 ABNORMAL WEIGHT LOSS: ICD-10-CM

## 2024-08-20 DIAGNOSIS — E78.5 HYPERLIPIDEMIA, UNSPECIFIED: ICD-10-CM

## 2024-08-20 DIAGNOSIS — E11.9 TYPE 2 DIABETES MELLITUS W/OUT COMPLICATIONS: ICD-10-CM

## 2024-08-20 LAB — HBA1C MFR BLD HPLC: 5.8

## 2024-08-20 PROCEDURE — 99214 OFFICE O/P EST MOD 30 MIN: CPT

## 2024-08-20 PROCEDURE — 95251 CONT GLUC MNTR ANALYSIS I&R: CPT

## 2024-08-20 PROCEDURE — 83036 HEMOGLOBIN GLYCOSYLATED A1C: CPT | Mod: QW

## 2024-08-20 NOTE — ASSESSMENT
[Diabetic Medications] : Risks and benefits of diabetic medications were discussed [FreeTextEntry1] : Diabetes Counseling: The patient was counseled on diabetes foot care, long term vascular complications of diabetes, carbohydrate consistent diet, importance of diet and exercise to improve glycemic control, achieve weight loss and improve cardiovascular health, exercise/effect on glucose and self monitoring of blood glucose. Patient was referred to ophthalmology for retinopathy screening. Risks and benefits of diabetic medications were discussed. No hx of pancreatitis, medullary thyroid cancer or MEN syndrome in self or family, no hx of retinopathy. Discussed side effects of GI upet and association with the aforementioned issues. discussed sglt2 inhibitor adverse effect profile including UTI,  risk, euglycemic dka. Hold SGLT2 72 hrs prior to procedures/surgery, hold while npo.  64 yo with DM2, HTN here for DM care.  #Type 2 Diabetes Mellitus: goal HbA1c is <7%.  HbA1c  5.8%  Plan: Metformin 500 mg twice daily and STOP farxiga 5 mg once daily -to see if improvement with urinary symptoms. Can add back if no difference and glucoses uptrend above goal.  He is back to being very sensitive to carbs, stress spikes his sugar as well.  - Continue off ozempic due to significant weight loss - alternatives to consider if another agent is needed off glp1: actos, dpp4.   Labs ordered: o check CMP, lipids, B12, UMCR yearly. o check HbA1c every 3-6 months  Education/Counseling done during this visit: o SMBG testing guidelines o Medications reviewed o Signs/Symptoms/Treatment of hypoglycemia o Encouraged 30 min exercise five days a week, portion control, carb consistent diet. o Recommended yearly foot exams and home foot precautions. Monofilament exam performed on 8/2024 was normal o Recommended at least yearly ophthalmology exams or as recommended by ophtho  #HTN: Goal BP <130/80 -Continue current antihypertensive regimen -UMCR normal 1/10/2024  #HLD: Goal LDL < 100 -Continue statin. -continue healthy dieting, exercise -LDL 95 1/2024 -Presently taking Atorvastatin.   #weight loss Thyroid functions, celiac panel, pancreatic autoantibodies were all normal. - stop farxiga - f/u with PCP -Ozempic was stopped at the prior visit.

## 2024-08-20 NOTE — PHYSICAL EXAM
[Alert] : alert [No Acute Distress] : no acute distress [No Respiratory Distress] : no respiratory distress [No Accessory Muscle Use] : no accessory muscle use [Clear to Auscultation] : lungs were clear to auscultation bilaterally [Normal PMI] : the apical impulse was normal [Normal S1, S2] : normal S1 and S2 [Normal Rate] : heart rate was normal [Oriented x3] : oriented to person, place, and time [Normal Affect] : the affect was normal [Normal Insight/Judgement] : insight and judgment were intact [Well Nourished] : well nourished [Well Developed] : well developed [Normal Sclera/Conjunctiva] : normal sclera/conjunctiva [EOMI] : extra ocular movement intact [No Proptosis] : no proptosis [Normal Oropharynx] : the oropharynx was normal [Thyroid Not Enlarged] : the thyroid was not enlarged [No Thyroid Nodules] : no palpable thyroid nodules [Regular Rhythm] : with a regular rhythm [No Edema] : no peripheral edema [Pedal Pulses Normal] : the pedal pulses are present [Normal Bowel Sounds] : normal bowel sounds [Not Tender] : non-tender [Not Distended] : not distended [Soft] : abdomen soft [Normal Anterior Cervical Nodes] : no anterior cervical lymphadenopathy [No Spinal Tenderness] : no spinal tenderness [Spine Straight] : spine straight [No Stigmata of Cushings Syndrome] : no stigmata of Cushings Syndrome [Normal Gait] : normal gait [Normal Strength/Tone] : muscle strength and tone were normal [No Rash] : no rash [Right foot was examined, including] : right foot ~C was examined, including visual inspection with sensory and pulse exams [Left foot was examined, including] : left foot ~C was examined, including visual inspection with sensory and pulse exams [Normal] : normal [Full ROM] : with full range of motion [Normal Reflexes] : deep tendon reflexes were 2+ and symmetric [No Tremors] : no tremors [Normal Sensation on Monofilament Testing] : normal sensation on monofilament testing of lower extremities [Acanthosis Nigricans] : no acanthosis nigricans [Diminished Throughout Both Feet] : normal tactile sensation with monofilament testing throughout both feet

## 2024-08-20 NOTE — HISTORY OF PRESENT ILLNESS
[FreeTextEntry1] : 64 year old male presents for follow up management of type 2 DM.   Diagnosed with preDM in 2016. In 2022 was told A1c went up so metformin was increased. Was on metformin 500 mg daily.  Previously with elevated A1c at 10.8%, A1c 2023 5.6% no chf or proteinuria  No known complications. Mother with history of DM2 in her 70s.  optho UTD  no paresthesias  has sciatica   Interval history: no new medical issues   Current regimen: Metformin 500 mg twice daily and farxiga 5 mg once daily.  urinary symptoms have somewhat improved  Previous medications: Ozempic 0.5 mg once weekly (was stopped due to significant weight loss) cannot tolerate higher dose of metformin due to GI upset.  Glucose monitoring: Dexcom, in range 99% of the time  Reports UTD with colonoscopy, PCP  Weight trend: stable 144 lbs   HbA1c trend: 2022 A1c 10.8%-->7% in 3/6/2023-->5.6% 23 A1c 24: 5.9% A1c 4/10/24: 6% a1c  5.8% today  ROS: Denies polyuria, polydipsia, blurry vision, floaters, numbness/tingling, foot ulcers.  Last dilated exam: , no retinopathy (Dr. Krishnamurthy) Last podiatry exam: has not been, no issues  Glucose Monitorin% in range   Daily routine/meals: -Bfast skips or egg on almond wrap or yogurt -Lunch tuna fish, celergy, almond flower wrap -Dinner stuffed pepper or zucchini, or meatloaf, spinach, cassava chips -Snacks/drinks: tries to eat lower carb in general  Exercise: very active, does a lot of stairs  SH: No etoh, drugs, or tobacco. Occupation:  wife works at [employer]

## 2024-09-09 ENCOUNTER — APPOINTMENT (OUTPATIENT)
Dept: INTERNAL MEDICINE | Facility: CLINIC | Age: 64
End: 2024-09-09

## 2024-09-17 ENCOUNTER — RX RENEWAL (OUTPATIENT)
Age: 64
End: 2024-09-17

## 2024-09-19 ENCOUNTER — RX RENEWAL (OUTPATIENT)
Age: 64
End: 2024-09-19

## 2024-10-14 ENCOUNTER — APPOINTMENT (OUTPATIENT)
Dept: FAMILY MEDICINE | Facility: CLINIC | Age: 64
End: 2024-10-14
Payer: COMMERCIAL

## 2024-10-14 VITALS
SYSTOLIC BLOOD PRESSURE: 110 MMHG | HEIGHT: 72 IN | WEIGHT: 144 LBS | TEMPERATURE: 97.4 F | BODY MASS INDEX: 19.5 KG/M2 | OXYGEN SATURATION: 99 % | RESPIRATION RATE: 16 BRPM | HEART RATE: 89 BPM | DIASTOLIC BLOOD PRESSURE: 72 MMHG

## 2024-10-14 DIAGNOSIS — E78.5 HYPERLIPIDEMIA, UNSPECIFIED: ICD-10-CM

## 2024-10-14 DIAGNOSIS — E11.9 TYPE 2 DIABETES MELLITUS W/OUT COMPLICATIONS: ICD-10-CM

## 2024-10-14 DIAGNOSIS — R35.0 FREQUENCY OF MICTURITION: ICD-10-CM

## 2024-10-14 DIAGNOSIS — Z23 ENCOUNTER FOR IMMUNIZATION: ICD-10-CM

## 2024-10-14 DIAGNOSIS — R60.0 LOCALIZED EDEMA: ICD-10-CM

## 2024-10-14 DIAGNOSIS — I10 ESSENTIAL (PRIMARY) HYPERTENSION: ICD-10-CM

## 2024-10-14 PROCEDURE — 99215 OFFICE O/P EST HI 40 MIN: CPT | Mod: 25

## 2024-10-14 PROCEDURE — 90656 IIV3 VACC NO PRSV 0.5 ML IM: CPT

## 2024-10-14 PROCEDURE — G0008: CPT

## 2024-11-04 ENCOUNTER — APPOINTMENT (OUTPATIENT)
Dept: ENDOCRINOLOGY | Facility: CLINIC | Age: 64
End: 2024-11-04
Payer: COMMERCIAL

## 2024-11-04 VITALS
HEIGHT: 72 IN | HEART RATE: 94 BPM | DIASTOLIC BLOOD PRESSURE: 66 MMHG | SYSTOLIC BLOOD PRESSURE: 104 MMHG | BODY MASS INDEX: 19.37 KG/M2 | OXYGEN SATURATION: 98 % | WEIGHT: 143 LBS

## 2024-11-04 DIAGNOSIS — E11.9 TYPE 2 DIABETES MELLITUS W/OUT COMPLICATIONS: ICD-10-CM

## 2024-11-04 DIAGNOSIS — E78.5 HYPERLIPIDEMIA, UNSPECIFIED: ICD-10-CM

## 2024-11-04 DIAGNOSIS — I10 ESSENTIAL (PRIMARY) HYPERTENSION: ICD-10-CM

## 2024-11-04 LAB
GLUCOSE BLDC GLUCOMTR-MCNC: 120
HBA1C MFR BLD HPLC: 6

## 2024-11-04 PROCEDURE — 82962 GLUCOSE BLOOD TEST: CPT

## 2024-11-04 PROCEDURE — 83036 HEMOGLOBIN GLYCOSYLATED A1C: CPT | Mod: QW

## 2024-11-04 PROCEDURE — 99214 OFFICE O/P EST MOD 30 MIN: CPT

## 2024-11-04 PROCEDURE — G2211 COMPLEX E/M VISIT ADD ON: CPT

## 2024-11-19 ENCOUNTER — APPOINTMENT (OUTPATIENT)
Dept: ENDOCRINOLOGY | Facility: CLINIC | Age: 64
End: 2024-11-19

## 2025-01-09 ENCOUNTER — APPOINTMENT (OUTPATIENT)
Dept: SURGERY | Facility: CLINIC | Age: 65
End: 2025-01-09
Payer: COMMERCIAL

## 2025-01-09 DIAGNOSIS — Z09 ENCOUNTER FOR FOLLOW-UP EXAMINATION AFTER COMPLETED TREATMENT FOR CONDITIONS OTHER THAN MALIGNANT NEOPLASM: ICD-10-CM

## 2025-01-09 PROCEDURE — 99213 OFFICE O/P EST LOW 20 MIN: CPT

## 2025-02-24 ENCOUNTER — APPOINTMENT (OUTPATIENT)
Dept: ENDOCRINOLOGY | Facility: CLINIC | Age: 65
End: 2025-02-24
Payer: COMMERCIAL

## 2025-02-24 ENCOUNTER — NON-APPOINTMENT (OUTPATIENT)
Age: 65
End: 2025-02-24

## 2025-02-24 VITALS
BODY MASS INDEX: 19.37 KG/M2 | SYSTOLIC BLOOD PRESSURE: 118 MMHG | HEIGHT: 72 IN | OXYGEN SATURATION: 98 % | HEART RATE: 84 BPM | WEIGHT: 143 LBS | DIASTOLIC BLOOD PRESSURE: 80 MMHG

## 2025-02-24 DIAGNOSIS — E78.5 HYPERLIPIDEMIA, UNSPECIFIED: ICD-10-CM

## 2025-02-24 DIAGNOSIS — I10 ESSENTIAL (PRIMARY) HYPERTENSION: ICD-10-CM

## 2025-02-24 DIAGNOSIS — E11.9 TYPE 2 DIABETES MELLITUS W/OUT COMPLICATIONS: ICD-10-CM

## 2025-02-24 LAB — HBA1C MFR BLD HPLC: 6

## 2025-02-24 PROCEDURE — G2211 COMPLEX E/M VISIT ADD ON: CPT

## 2025-02-24 PROCEDURE — 99214 OFFICE O/P EST MOD 30 MIN: CPT

## 2025-02-24 PROCEDURE — 95251 CONT GLUC MNTR ANALYSIS I&R: CPT

## 2025-02-24 PROCEDURE — 83036 HEMOGLOBIN GLYCOSYLATED A1C: CPT | Mod: QW

## 2025-02-25 ENCOUNTER — RX RENEWAL (OUTPATIENT)
Age: 65
End: 2025-02-25

## 2025-02-27 ENCOUNTER — RX RENEWAL (OUTPATIENT)
Age: 65
End: 2025-02-27

## 2025-03-31 ENCOUNTER — APPOINTMENT (OUTPATIENT)
Dept: ENDOCRINOLOGY | Facility: CLINIC | Age: 65
End: 2025-03-31
Payer: COMMERCIAL

## 2025-03-31 VITALS
BODY MASS INDEX: 19.64 KG/M2 | WEIGHT: 145 LBS | HEIGHT: 72 IN | OXYGEN SATURATION: 98 % | DIASTOLIC BLOOD PRESSURE: 70 MMHG | SYSTOLIC BLOOD PRESSURE: 124 MMHG | HEART RATE: 88 BPM

## 2025-03-31 DIAGNOSIS — I10 ESSENTIAL (PRIMARY) HYPERTENSION: ICD-10-CM

## 2025-03-31 DIAGNOSIS — E11.9 TYPE 2 DIABETES MELLITUS W/OUT COMPLICATIONS: ICD-10-CM

## 2025-03-31 DIAGNOSIS — E78.5 HYPERLIPIDEMIA, UNSPECIFIED: ICD-10-CM

## 2025-03-31 PROCEDURE — 99214 OFFICE O/P EST MOD 30 MIN: CPT

## 2025-03-31 PROCEDURE — G2211 COMPLEX E/M VISIT ADD ON: CPT

## 2025-04-02 ENCOUNTER — TRANSCRIPTION ENCOUNTER (OUTPATIENT)
Age: 65
End: 2025-04-02

## 2025-04-02 LAB
ALBUMIN SERPL ELPH-MCNC: 4.5 G/DL
ALP BLD-CCNC: 45 U/L
ALT SERPL-CCNC: 17 U/L
ANION GAP SERPL CALC-SCNC: 10 MMOL/L
AST SERPL-CCNC: 18 U/L
BILIRUB SERPL-MCNC: 0.5 MG/DL
BUN SERPL-MCNC: 18 MG/DL
CALCIUM SERPL-MCNC: 9.6 MG/DL
CHLORIDE SERPL-SCNC: 101 MMOL/L
CHOLEST SERPL-MCNC: 143 MG/DL
CO2 SERPL-SCNC: 29 MMOL/L
CREAT SERPL-MCNC: 0.72 MG/DL
CREAT SPEC-SCNC: 57 MG/DL
EGFRCR SERPLBLD CKD-EPI 2021: 103 ML/MIN/1.73M2
GLUCOSE SERPL-MCNC: 140 MG/DL
HDLC SERPL-MCNC: 54 MG/DL
LDLC SERPL-MCNC: 73 MG/DL
MICROALBUMIN 24H UR DL<=1MG/L-MCNC: <1.2 MG/DL
MICROALBUMIN/CREAT 24H UR-RTO: NORMAL MG/G
NONHDLC SERPL-MCNC: 89 MG/DL
POTASSIUM SERPL-SCNC: 4.1 MMOL/L
PROT SERPL-MCNC: 6.5 G/DL
SODIUM SERPL-SCNC: 140 MMOL/L
TRIGL SERPL-MCNC: 83 MG/DL
VIT B12 SERPL-MCNC: 891 PG/ML

## 2025-06-23 ENCOUNTER — TRANSCRIPTION ENCOUNTER (OUTPATIENT)
Age: 65
End: 2025-06-23

## 2025-07-14 ENCOUNTER — APPOINTMENT (OUTPATIENT)
Dept: ENDOCRINOLOGY | Facility: CLINIC | Age: 65
End: 2025-07-14

## 2025-07-22 ENCOUNTER — APPOINTMENT (OUTPATIENT)
Dept: ENDOCRINOLOGY | Facility: CLINIC | Age: 65
End: 2025-07-22
Payer: COMMERCIAL

## 2025-07-22 VITALS
HEART RATE: 88 BPM | SYSTOLIC BLOOD PRESSURE: 112 MMHG | WEIGHT: 153 LBS | BODY MASS INDEX: 20.72 KG/M2 | OXYGEN SATURATION: 98 % | HEIGHT: 72 IN | DIASTOLIC BLOOD PRESSURE: 80 MMHG

## 2025-07-22 DIAGNOSIS — I10 ESSENTIAL (PRIMARY) HYPERTENSION: ICD-10-CM

## 2025-07-22 DIAGNOSIS — E78.5 HYPERLIPIDEMIA, UNSPECIFIED: ICD-10-CM

## 2025-07-22 DIAGNOSIS — E11.9 TYPE 2 DIABETES MELLITUS W/OUT COMPLICATIONS: ICD-10-CM

## 2025-07-22 PROCEDURE — 99214 OFFICE O/P EST MOD 30 MIN: CPT

## 2025-07-22 PROCEDURE — 82962 GLUCOSE BLOOD TEST: CPT

## 2025-07-22 PROCEDURE — G2211 COMPLEX E/M VISIT ADD ON: CPT

## 2025-07-22 PROCEDURE — 83036 HEMOGLOBIN GLYCOSYLATED A1C: CPT | Mod: QW

## 2025-07-22 RX ORDER — BLOOD SUGAR DIAGNOSTIC
STRIP MISCELLANEOUS TWICE DAILY
Qty: 2 | Refills: 3 | Status: ACTIVE | COMMUNITY
Start: 2025-07-22 | End: 1900-01-01

## 2025-07-23 ENCOUNTER — APPOINTMENT (OUTPATIENT)
Dept: GASTROENTEROLOGY | Facility: CLINIC | Age: 65
End: 2025-07-23
Payer: COMMERCIAL

## 2025-07-23 VITALS
BODY MASS INDEX: 20.72 KG/M2 | HEIGHT: 72 IN | WEIGHT: 153 LBS | DIASTOLIC BLOOD PRESSURE: 62 MMHG | SYSTOLIC BLOOD PRESSURE: 115 MMHG | HEART RATE: 95 BPM | OXYGEN SATURATION: 98 %

## 2025-07-23 DIAGNOSIS — Z86.0101 PERSONAL HISTORY OF ADENOMATOUS AND SERRATED COLON POLYPS: ICD-10-CM

## 2025-07-23 LAB
GLUCOSE BLDC GLUCOMTR-MCNC: 195
HBA1C MFR BLD HPLC: 6.2

## 2025-07-23 PROCEDURE — 99203 OFFICE O/P NEW LOW 30 MIN: CPT

## 2025-07-23 RX ORDER — SODIUM SULFATE, POTASSIUM SULFATE AND MAGNESIUM SULFATE 1.6; 3.13; 17.5 G/177ML; G/177ML; G/177ML
17.5-3.13-1.6 SOLUTION ORAL
Qty: 1 | Refills: 0 | Status: ACTIVE | COMMUNITY
Start: 2025-07-23 | End: 1900-01-01

## 2025-08-19 ENCOUNTER — RX RENEWAL (OUTPATIENT)
Age: 65
End: 2025-08-19

## (undated) DEVICE — DRAPE LIGHT HANDLE COVER (GREEN)

## (undated) DEVICE — GLV 6.5 PROTEXIS (WHITE)

## (undated) DEVICE — STAPLER SKIN VISI-STAT 35 WIDE

## (undated) DEVICE — SUT SURGIPRO 0 30" GS-22

## (undated) DEVICE — SUT POLYSORB 4-0 30" C-13 UNDYED

## (undated) DEVICE — PREP CHLORAPREP HI-LITE ORANGE 26ML

## (undated) DEVICE — SPECIMEN CONTAINER 100ML

## (undated) DEVICE — POSITIONER FOAM HEAD CRADLE (PINK)

## (undated) DEVICE — ELCTR BOVIE PENCIL SMOKE EVACUATION

## (undated) DEVICE — SUT CHROMIC 2-0 54" REEL

## (undated) DEVICE — WARMING BLANKET UPPER ADULT

## (undated) DEVICE — SUT POLYSORB 2-0 18" V-20

## (undated) DEVICE — SUT SURGIPRO 2-0 30" V-20

## (undated) DEVICE — DRAIN PENROSE .25" X 12" SILICONE

## (undated) DEVICE — ELCTR STRYKER NEPTUNE SMOKE EVACUATION PENCIL (GREEN)

## (undated) DEVICE — SUT POLYSORB 3-0 18" V-20 UNDYED

## (undated) DEVICE — DRSG CURITY GAUZE SPONGE 4 X 4" 12-PLY

## (undated) DEVICE — VENODYNE/SCD SLEEVE CALF MEDIUM

## (undated) DEVICE — CANISTER SUCTION LID GUARD 3000CC

## (undated) DEVICE — SUT VICRYL 4-0 27" SH

## (undated) DEVICE — SOL IRR POUR H2O 1500ML

## (undated) DEVICE — SUT POLYSORB 3-0 30" V-20 UNDYED

## (undated) DEVICE — SOLIDIFIER CANN EXPRESS 3K

## (undated) DEVICE — SOL IRR POUR NS 0.9% 500ML

## (undated) DEVICE — GLV 7.5 ULTRAFREE MAX

## (undated) DEVICE — SUT CHROMIC 2-0 60" REEL

## (undated) DEVICE — LAP PAD 18 X 18"

## (undated) DEVICE — DRSG DERMABOND 0.7ML

## (undated) DEVICE — PACK MINOR

## (undated) DEVICE — DRSG STERISTRIPS 0.5 X 4"